# Patient Record
Sex: FEMALE | Race: WHITE | NOT HISPANIC OR LATINO | Employment: OTHER | URBAN - METROPOLITAN AREA
[De-identification: names, ages, dates, MRNs, and addresses within clinical notes are randomized per-mention and may not be internally consistent; named-entity substitution may affect disease eponyms.]

---

## 2017-06-06 ENCOUNTER — TELEPHONE (OUTPATIENT)
Dept: FAMILY MEDICINE | Facility: CLINIC | Age: 67
End: 2017-06-06

## 2017-06-06 ENCOUNTER — OFFICE VISIT (OUTPATIENT)
Dept: FAMILY MEDICINE | Facility: CLINIC | Age: 67
End: 2017-06-06
Payer: COMMERCIAL

## 2017-06-06 VITALS
OXYGEN SATURATION: 98 % | DIASTOLIC BLOOD PRESSURE: 74 MMHG | BODY MASS INDEX: 35.44 KG/M2 | HEIGHT: 63 IN | SYSTOLIC BLOOD PRESSURE: 130 MMHG | HEART RATE: 69 BPM | WEIGHT: 200 LBS | TEMPERATURE: 98.1 F

## 2017-06-06 DIAGNOSIS — R30.0 DYSURIA: Primary | ICD-10-CM

## 2017-06-06 LAB
ALBUMIN UR-MCNC: 30 MG/DL
APPEARANCE UR: ABNORMAL
BILIRUB UR QL STRIP: NEGATIVE
COLOR UR AUTO: YELLOW
GLUCOSE UR STRIP-MCNC: NEGATIVE MG/DL
HGB UR QL STRIP: ABNORMAL
KETONES UR STRIP-MCNC: NEGATIVE MG/DL
LEUKOCYTE ESTERASE UR QL STRIP: ABNORMAL
NITRATE UR QL: NEGATIVE
NON-SQ EPI CELLS #/AREA URNS LPF: ABNORMAL /LPF
PH UR STRIP: 6 PH (ref 5–7)
RBC #/AREA URNS AUTO: ABNORMAL /HPF (ref 0–2)
SP GR UR STRIP: 1.02 (ref 1–1.03)
URN SPEC COLLECT METH UR: ABNORMAL
UROBILINOGEN UR STRIP-ACNC: 0.2 EU/DL (ref 0.2–1)
WBC #/AREA URNS AUTO: ABNORMAL /HPF (ref 0–2)

## 2017-06-06 PROCEDURE — 99213 OFFICE O/P EST LOW 20 MIN: CPT | Performed by: INTERNAL MEDICINE

## 2017-06-06 PROCEDURE — 81001 URINALYSIS AUTO W/SCOPE: CPT | Performed by: INTERNAL MEDICINE

## 2017-06-06 PROCEDURE — 87086 URINE CULTURE/COLONY COUNT: CPT | Performed by: INTERNAL MEDICINE

## 2017-06-06 RX ORDER — CALCIUM CARBONATE 500(1250)
1200 TABLET ORAL DAILY
COMMUNITY
End: 2018-09-14

## 2017-06-06 RX ORDER — SULFAMETHOXAZOLE/TRIMETHOPRIM 800-160 MG
1 TABLET ORAL 2 TIMES DAILY
Qty: 6 TABLET | Refills: 0 | Status: SHIPPED | OUTPATIENT
Start: 2017-06-06 | End: 2017-06-09

## 2017-06-06 NOTE — MR AVS SNAPSHOT
"              After Visit Summary   2017    Tika Swartz    MRN: [id number]           Patient Information     Date Of Birth          1950        Visit Information        Provider Department      2017 1:00 PM Estuardo Last MD [facility]        Today's Diagnoses     Dysuria    -  1      Care Instructions    Take the prescription twice daily for 3 days and if your symptoms do not go away let me know.    Estuardo Last M.D.            Follow-ups after your visit        Who to contact     If you have questions or need follow up information about today's clinic visit or your schedule please contact [facility] directly at [phone].  Normal or non-critical lab and imaging results will be communicated to you by MyChart, letter or phone within 4 business days after the clinic has received the results. If you do not hear from us within 7 days, please contact the clinic through MyChart or phone. If you have a critical or abnormal lab result, we will notify you by phone as soon as possible.  Submit refill requests through [employer] or call your pharmacy and they will forward the refill request to us. Please allow 3 business days for your refill to be completed.          Additional Information About Your Visit        MyChart Information     [employer] lets you send messages to your doctor, view your test results, renew your prescriptions, schedule appointments and more. To sign up, go to [email or web address] . Click on \"Log in\" on the left side of the screen, which will take you to the Welcome page. Then click on \"Sign up Now\" on the right side of the page.     You will be asked to enter the access code listed below, as well as some personal information. Please follow the directions to create your username and password.     Your access code is: [email or web address]  Expires: 2017  1:31 PM     Your access code will  in 90 days. If you need help or a new code, please call your " "[facility] or [phone].        Care EveryWhere ID     This is your Care EveryWhere ID. This could be used by other organizations to access your [place] medical records  [id number]        Your Vitals Were     Pulse Temperature Height Last Period Pulse Oximetry Breastfeeding?    69 98.1  F (36.7  C) (Oral) 5' 2.5\" (1.588 m) 04/13/2001 98% No    BMI (Body Mass Index)                   36 kg/m2            Blood Pressure from Last 3 Encounters:   06/06/17 130/74   11/08/16 123/72   09/29/16 128/76    Weight from Last 3 Encounters:   06/06/17 200 lb (90.7 kg)   11/08/16 200 lb (90.7 kg)   09/29/16 220 lb (99.8 kg)              We Performed the Following     *UA reflex to Microscopic     Urine Culture Aerobic Bacterial     Urine Microscopic          Today's Medication Changes          These changes are accurate as of: 6/6/17  1:31 PM.  If you have any questions, ask your nurse or doctor.               Start taking these medicines.        Dose/Directions    sulfamethoxazole-trimethoprim 800-160 MG per tablet   Commonly known as:  BACTRIM DS/SEPTRA DS   Used for:  Dysuria   Started by:  Estuardo Last MD        Dose:  1 tablet   Take 1 tablet by mouth 2 times daily for 3 days   Quantity:  6 tablet   Refills:  0         Stop taking these medicines if you haven't already. Please contact your care team if you have questions.     alendronate 70 MG tablet   Commonly known as:  FOSAMAX   Stopped by:  Estuardo Last MD                Where to get your medicines      These medications were sent to [place] [address] [facility] [address]  [address] [facility] [address]     Phone:  [phone]     sulfamethoxazole-trimethoprim 800-160 MG per tablet                Primary Care Provider Office Phone # Fax #    Estuardo Last [phone]       [facility] [address]   [place] [address]        Thank you!     Thank you for choosing " [facility]  for your care. Our goal is always to provide you with excellent care. Hearing back from our patients is one way we can continue to improve our services. Please take a few minutes to complete the written survey that you may receive in the mail after your visit with us. Thank you!             Your Updated Medication List - Protect others around you: Learn how to safely use, store and throw away your medicines at www.disposemymeds.org.          This list is accurate as of: 6/6/17  1:31 PM.  Always use your most recent med list.                   Brand Name Dispense Instructions for use    calcium carbonate 1250 MG tablet    OS-CHESTER 500 mg [place]. [place]     Take 1,200 mg by mouth daily       Multi-vitamin Tabs tablet   Generic drug:  multivitamin, therapeutic with minerals      1 TABLET DAILY       sulfamethoxazole-trimethoprim 800-160 MG per tablet    BACTRIM DS/SEPTRA DS    6 tablet    Take 1 tablet by mouth 2 times daily for 3 days       TYLENOL EXTRA STRENGTH PO          VITAMIN D (CHOLECALCIFEROL) PO      Take 2,000 Units by mouth daily

## 2017-06-06 NOTE — NURSING NOTE
"Chief Complaint   Patient presents with     UTI       Initial /74  Pulse 69  Temp 98.1  F (36.7  C) (Oral)  Ht 5' 2.5\" (1.588 m)  Wt 200 lb (90.7 kg)  LMP 04/13/2001  SpO2 98%  Breastfeeding? No  BMI 36 kg/m2 Estimated body mass index is 36 kg/(m^2) as calculated from the following:    Height as of this encounter: 5' 2.5\" (1.588 m).    Weight as of this encounter: 200 lb (90.7 kg).  Medication Reconciliation: complete   Leandra SALTER CMA      "

## 2017-06-06 NOTE — PROGRESS NOTES
Tika Swartz is a 67 year old female who presents for possible uti.  3 weeks ago some fatigue, lb ache and dysuria, tried more liq then fine but then back last 4 days.  Dysuria, some freq, no pyuria or hematuria, slight discomfort lower abdomen, slight lb aches, no f,c,s.  NO vag blood.  Prior uti years ago.    Past Medical History:   Diagnosis Date     Adenomatous colon polyp 2006    fu rec 2011, fu done 2016 and polyps, to fu 2019     CA - breast cancer 2006    bilat mastectomy, no chemo or xrt, Dr. Garcia, not seeing onc as of 2015     Fibromyalgia      Osteoporosis 2016    -1.9 left hip, -0.8 spine, dx based on low impact fx, tried fosamax but gi upset     Past Surgical History:   Procedure Laterality Date     BIOPSY OF BREAST, NEEDLE CORE  11/22/05    right breast     BIOPSY OF BREAST, NEEDLE CORE  12/5/05    left and right breast     C VAGINAL HYSTERECTOMY  2005    prolapsed uterus     fractured wrist Left 2015    low impact     HYSTERECTOMY, PAP STILL INDICATED  2005     MASTECTOMY SIMPLE BILATERAL  2005     Social History     Social History     Marital status:      Spouse name: N/A     Number of children: 2     Years of education: N/A     Occupational History     re[employer]      Social History Main Topics     Smoking status: Never Smoker     Smokeless tobacco: Never Used     Alcohol use 0.0 oz/week     0 Standard drinks or equivalent per week      Comment: occasionally     Drug use: No     Sexual activity: Not Currently     Other Topics Concern     Parent/Sibling W/ Cabg, Mi Or Angioplasty Before 65f 55m? No     Social History Narrative     Current Outpatient Prescriptions   Medication Sig Dispense Refill     calcium carbonate (OS-CHESTER 500 MG "[employer]. [place]) 1250 MG tablet Take 1,200 mg by mouth daily       VITAMIN D, CHOLECALCIFEROL, PO Take 2,000 Units by mouth daily       Acetaminophen (TYLENOL EXTRA STRENGTH PO)        MULTI-VITAMIN OR TABS 1 TABLET DAILY       Allergies   Allergen Reactions      "Seasonal Allergies      Eye watering, nasal symptoms     FAMILY HISTORY NOTED AND REVIEWED    REVIEW OF SYSTEMS: above    PHYSICAL EXAM    /74  Pulse 69  Temp 98.1  F (36.7  C) (Oral)  Ht 5' 2.5\" (1.588 m)  Wt 200 lb (90.7 kg)  LMP 04/13/2001  SpO2 98%  Breastfeeding? No  BMI 36 kg/m2    Patient appears non toxic  Abdomen normal active bowel sounds, soft, min tender, no mgr, no hepatosplenomegaly    ua noted    ASSESSMENT:  Uncomplicated uti    PLAN:  Bactrim ds bid for 3 days, call if c/o not gone soon    Estuardo Last M.D.          "

## 2017-06-06 NOTE — PATIENT INSTRUCTIONS
Take the prescription twice daily for 3 days and if your symptoms do not go away let me know.    Estuardo Last M.D.

## 2017-06-06 NOTE — TELEPHONE ENCOUNTER
Reason for call:  Patient reporting a symptom    Symptom or request: poss bladder infection, aching all over, back pain,  Incontinence, burning, had symptoms a month ago but resolved    Duration (how long have symptoms been present): 5 days    Have you been treated for this before? Yes    Additional comments: worse today would like to be worked in     Phone Number patient can be reached at:  Home number on file [phone] (home)    Best Time:  anytime    Can we leave a detailed message on this number:  YES    Call taken on 6/6/2017 at 8:10 AM by Selina Jacobs

## 2017-06-07 LAB
BACTERIA SPEC CULT: NORMAL
MICRO REPORT STATUS: NORMAL
SPECIMEN SOURCE: NORMAL

## 2017-06-09 ENCOUNTER — TELEPHONE (OUTPATIENT)
Dept: FAMILY MEDICINE | Facility: CLINIC | Age: 67
End: 2017-06-09

## 2017-06-09 NOTE — TELEPHONE ENCOUNTER
Please call patient, uc is negative, can stop the antibiotic, if ongoing urine issues let me know    Estuardo Last M.D.

## 2017-06-09 NOTE — TELEPHONE ENCOUNTER
Spoke with patient and she states she her sx's have resolved -and has understanding of what was stated to her in her results.will f/u segundo Michelle- CMA

## 2017-09-25 ENCOUNTER — ALLIED HEALTH/NURSE VISIT (OUTPATIENT)
Dept: NURSING | Facility: CLINIC | Age: 67
End: 2017-09-25
Payer: COMMERCIAL

## 2017-09-25 DIAGNOSIS — Z23 NEED FOR PROPHYLACTIC VACCINATION AND INOCULATION AGAINST INFLUENZA: Primary | ICD-10-CM

## 2017-09-25 PROCEDURE — 99207 ZZC NO CHARGE NURSE ONLY: CPT

## 2017-09-25 PROCEDURE — G0008 ADMIN INFLUENZA VIRUS VAC: HCPCS

## 2017-09-25 PROCEDURE — 90662 IIV NO PRSV INCREASED AG IM: CPT

## 2017-09-25 NOTE — PROGRESS NOTES
Injectable Influenza Immunization Documentation    1.  Is the person to be vaccinated sick today?   No    2. Does the person to be vaccinated have an allergy to a component   of the vaccine?   No    3. Has the person to be vaccinated ever had a serious reaction   to influenza vaccine in the past?   No    4. Has the person to be vaccinated ever had Guillain-Barré syndrome?   No    Form completed by Arleth Raymundo CMA

## 2017-09-25 NOTE — MR AVS SNAPSHOT
"              After Visit Summary   2017    Tika Swartz    MRN: [id number]           Patient Information     Date Of Birth          1950        Visit Information        Provider Department      2017 11:00 AM CS NURSE [facility] Mikki        Today's Diagnoses     Need for prophylactic vaccination and inoculation against influenza    -  1       Follow-ups after your visit        Who to contact     If you have questions or need follow up information about today's clinic visit or your schedule please contact [facility] directly at [phone].  Normal or non-critical lab and imaging results will be communicated to you by payworkshart, letter or phone within 4 business days after the clinic has received the results. If you do not hear from us within 7 days, please contact the clinic through [employer] or phone. If you have a critical or abnormal lab result, we will notify you by phone as soon as possible.  Submit refill requests through [employer] or call your pharmacy and they will forward the refill request to us. Please allow 3 business days for your refill to be completed.          Additional Information About Your Visit        MyChart Information     [employer] lets you send messages to your doctor, view your test results, renew your prescriptions, schedule appointments and more. To sign up, go to [email or web address] . Click on \"Log in\" on the left side of the screen, which will take you to the Welcome page. Then click on \"Sign up Now\" on the right side of the page.     You will be asked to enter the access code listed below, as well as some personal information. Please follow the directions to create your username and password.     Your access code is: [email or web address]  Expires: 2017 11:14 AM     Your access code will  in 90 days. If you need help or a new code, please call your [facility] or [phone].        Care EveryWhere ID     This is your Care EveryWhere ID. This " could be used by other organizations to access your [facility] medical records  [id number]        Your Vitals Were     Last Period                   04/13/2001            Blood Pressure from Last 3 Encounters:   06/06/17 130/74   11/08/16 123/72   09/29/16 128/76    Weight from Last 3 Encounters:   06/06/17 200 lb (90.7 kg)   11/08/16 200 lb (90.7 kg)   09/29/16 220 lb (99.8 kg)              We Performed the Following     ADMIN INFLUENZA (For MEDICARE Patients ONLY) []     FLU VACCINE, INCREASED ANTIGEN, PRESV FREE, AGE 65+ [19815]        Primary Care Provider Office Phone # Fax #    Estuardo Jack Last [phone] [address] [address]        Equal Access to Services     [facility] MARCI : Hadii james steele hadasho Sostuart, waaxda luqadaha, qaybta kaalmada adri, oscar love . So [facility] [phone].    ATENCIÓN: Si habla español, tiene a darling disposición servicios gratuitos de asistencia lingüística. Azael al [phone].    We comply with applicable federal civil rights laws and [place] laws. We do not discriminate on the basis of race, color, national origin, age, disability sex, sexual orientation or gender identity.            Thank you!     Thank you for choosing [facility]  for your care. Our goal is always to provide you with excellent care. Hearing back from our patients is one way we can continue to improve our services. Please take a few minutes to complete the written survey that you may receive in the mail after your visit with us. Thank you!             Your Updated Medication List - Protect others around you: Learn how to safely use, store and throw away your medicines at www.disposemymeds.org.          This list is accurate as of: 9/25/17 11:14 AM.  Always use your most recent med list.                   Brand Name Dispense Instructions for use Diagnosis    calcium carbonate 1250 MG tablet    OS-CHESTER 500 mg [place]. [place]      Take 1,200 mg by mouth daily        Multi-vitamin Tabs tablet   Generic drug:  multivitamin, therapeutic with minerals      1 TABLET DAILY        TYLENOL EXTRA STRENGTH PO           VITAMIN D (CHOLECALCIFEROL) PO      Take 2,000 Units by mouth daily

## 2018-09-14 ENCOUNTER — OFFICE VISIT (OUTPATIENT)
Dept: FAMILY MEDICINE | Facility: CLINIC | Age: 68
End: 2018-09-14
Payer: COMMERCIAL

## 2018-09-14 VITALS
DIASTOLIC BLOOD PRESSURE: 64 MMHG | HEIGHT: 63 IN | TEMPERATURE: 97 F | WEIGHT: 200 LBS | SYSTOLIC BLOOD PRESSURE: 136 MMHG | HEART RATE: 62 BPM | OXYGEN SATURATION: 97 % | BODY MASS INDEX: 35.44 KG/M2

## 2018-09-14 DIAGNOSIS — C50.919 MALIGNANT NEOPLASM OF FEMALE BREAST, UNSPECIFIED ESTROGEN RECEPTOR STATUS, UNSPECIFIED LATERALITY, UNSPECIFIED SITE OF BREAST (H): ICD-10-CM

## 2018-09-14 DIAGNOSIS — Z23 NEED FOR PROPHYLACTIC VACCINATION AND INOCULATION AGAINST INFLUENZA: ICD-10-CM

## 2018-09-14 DIAGNOSIS — M81.0 OSTEOPOROSIS, UNSPECIFIED OSTEOPOROSIS TYPE, UNSPECIFIED PATHOLOGICAL FRACTURE PRESENCE: ICD-10-CM

## 2018-09-14 DIAGNOSIS — D12.6 ADENOMATOUS POLYP OF COLON, UNSPECIFIED PART OF COLON: ICD-10-CM

## 2018-09-14 DIAGNOSIS — M79.7 FIBROMYALGIA: ICD-10-CM

## 2018-09-14 DIAGNOSIS — Z00.00 ROUTINE GENERAL MEDICAL EXAMINATION AT A HEALTH CARE FACILITY: Primary | ICD-10-CM

## 2018-09-14 DIAGNOSIS — E66.9 NON MORBID OBESITY, UNSPECIFIED OBESITY TYPE: ICD-10-CM

## 2018-09-14 LAB
ERYTHROCYTE [DISTWIDTH] IN BLOOD BY AUTOMATED COUNT: 13.8 % (ref 10–15)
HCT VFR BLD AUTO: 46 % (ref 35–47)
HGB BLD-MCNC: 15.4 G/DL (ref 11.7–15.7)
MCH RBC QN AUTO: 31.4 PG (ref 26.5–33)
MCHC RBC AUTO-ENTMCNC: 33.5 G/DL (ref 31.5–36.5)
MCV RBC AUTO: 94 FL (ref 78–100)
PLATELET # BLD AUTO: 178 10E9/L (ref 150–450)
RBC # BLD AUTO: 4.91 10E12/L (ref 3.8–5.2)
WBC # BLD AUTO: 6.8 10E9/L (ref 4–11)

## 2018-09-14 PROCEDURE — 36415 COLL VENOUS BLD VENIPUNCTURE: CPT | Performed by: INTERNAL MEDICINE

## 2018-09-14 PROCEDURE — 85027 COMPLETE CBC AUTOMATED: CPT | Performed by: INTERNAL MEDICINE

## 2018-09-14 PROCEDURE — 90732 PPSV23 VACC 2 YRS+ SUBQ/IM: CPT | Performed by: INTERNAL MEDICINE

## 2018-09-14 PROCEDURE — 80053 COMPREHEN METABOLIC PANEL: CPT | Performed by: INTERNAL MEDICINE

## 2018-09-14 PROCEDURE — G0009 ADMIN PNEUMOCOCCAL VACCINE: HCPCS | Performed by: INTERNAL MEDICINE

## 2018-09-14 PROCEDURE — G0008 ADMIN INFLUENZA VIRUS VAC: HCPCS | Performed by: INTERNAL MEDICINE

## 2018-09-14 PROCEDURE — 82306 VITAMIN D 25 HYDROXY: CPT | Performed by: INTERNAL MEDICINE

## 2018-09-14 PROCEDURE — 90662 IIV NO PRSV INCREASED AG IM: CPT | Performed by: INTERNAL MEDICINE

## 2018-09-14 PROCEDURE — 80061 LIPID PANEL: CPT | Performed by: INTERNAL MEDICINE

## 2018-09-14 PROCEDURE — G0439 PPPS, SUBSEQ VISIT: HCPCS | Performed by: INTERNAL MEDICINE

## 2018-09-14 NOTE — PROGRESS NOTES
Injectable Influenza Immunization Documentation    1.  Is the person to be vaccinated sick today?   No    2. Does the person to be vaccinated have an allergy to a component   of the vaccine?   No  Egg Allergy Algorithm Link    3. Has the person to be vaccinated ever had a serious reaction   to influenza vaccine in the past?   No    4. Has the person to be vaccinated ever had Guillain-Barré syndrome?   No    Form completed by Rae Ortiz CMA  Prior to injection verified patient identity using patient's name and date of birth.  Due to injection administration, patient instructed to remain in clinic for 15 minutes  afterwards, and to report any adverse reaction to me immediately.

## 2018-09-14 NOTE — MR AVS SNAPSHOT
After Visit Summary   9/14/2018    Tiak Swartz    MRN: [id number]           Patient Information     Date Of Birth          1950        Visit Information        Provider Department      9/14/2018 11:30 AM Estuardo Last MD [facility]        Today's Diagnoses     Routine general medical examination at a health care facility    -  1    Malignant neoplasm of female breast, unspecified estrogen receptor status, unspecified laterality, unspecified site of breast (H)        Adenomatous polyp of colon, unspecified part of colon        Fibromyalgia        Osteoporosis, unspecified osteoporosis type, unspecified pathological fracture presence        Non morbid obesity, unspecified obesity type          Care Instructions      Preventive Health Recommendations    Female Ages 65 +    Yearly exam:     See your health care provider every year in order to  o Review health changes.   o Discuss preventive care.    o Review your medicines if your doctor has prescribed any.      You no longer need a yearly Pap test unless you've had an abnormal Pap test in the past 10 years. If you have vaginal symptoms, such as bleeding or discharge, be sure to talk with your provider about a Pap test.      Every 1 to 2 years, have a mammogram.  If you are over 69, talk with your health care provider about whether or not you want to continue having screening mammograms.      Every 10 years, have a colonoscopy. Or, have a yearly FIT test (stool test). These exams will check for colon cancer.       Have a cholesterol test every 5 years, or more often if your doctor advises it.       Have a diabetes test (fasting glucose) every three years. If you are at risk for diabetes, you should have this test more often.       At age 65, have a bone density scan (DEXA) to check for osteoporosis (brittle bone disease).    Shots:    Get a flu shot each year.    Get a tetanus shot every 10 years.    Talk to your doctor about  your pneumonia vaccines. There are now two you should receive - Pneumovax (PPSV 23) and Prevnar (PCV 13).    Talk to your pharmacist about the shingles vaccine.    Talk to your doctor about the hepatitis B vaccine.    Nutrition:     Eat at least 5 servings of fruits and vegetables each day.      Eat whole-grain bread, whole-wheat pasta and brown rice instead of white grains and rice.      Get adequate Calcium and Vitamin D.     Lifestyle    Exercise at least 150 minutes a week (30 minutes a day, 5 days a week). This will help you control your weight and prevent disease.      Limit alcohol to one drink per day.      No smoking.       Wear sunscreen to prevent skin cancer.       See your dentist twice a year for an exam and cleaning.      See your eye doctor every 1 to 2 years to screen for conditions such as glaucoma, macular degeneration and cataracts.          Follow-ups after your visit        Future tests that were ordered for you today     Open Future Orders        Priority Expected Expires Ordered    DX Hip/Pelvis/Spine Routine 9/14/2018 9/14/2019 9/14/2018            Who to contact     If you have questions or need follow up information about today's clinic visit or your schedule please contact [facility] directly at [phone].  Normal or non-critical lab and imaging results will be communicated to you by MyChart, letter or phone within 4 business days after the clinic has received the results. If you do not hear from us within 7 days, please contact the clinic through MyChart or phone. If you have a critical or abnormal lab result, we will notify you by phone as soon as possible.  Submit refill requests through [employer] or call your pharmacy and they will forward the refill request to us. Please allow 3 business days for your refill to be completed.          Additional Information About Your Visit        Care EveryWhere ID     This is your Care EveryWhere ID. This could be used by other  "organizations to access your [place] medical records  [id number]        Your Vitals Were     Pulse Temperature Height Last Period Pulse Oximetry Breastfeeding?    62 97  F (36.1  C) (Oral) 5' 2.5\" (1.588 m) 04/13/2001 97% No    BMI (Body Mass Index)                   36 kg/m2            Blood Pressure from Last 3 Encounters:   09/14/18 136/64   06/06/17 130/74   11/08/16 123/72    Weight from Last 3 Encounters:   09/14/18 200 lb (90.7 kg)   06/06/17 200 lb (90.7 kg)   11/08/16 200 lb (90.7 kg)              We Performed the Following     CBC with platelets     Comprehensive metabolic panel     HC FLU VACCINE, INCREASED ANTIGEN, PRESV FREE     Lipid panel reflex to direct LDL Non-fasting     PNEUMOCOCCAL VACCINE,ADULT,SQ OR IM     Vitamin D Deficiency          Today's Medication Changes          These changes are accurate as of 9/14/18 11:38 AM.  If you have any questions, ask your nurse or doctor.               Stop taking these medicines if you haven't already. Please contact your care team if you have questions.     calcium carbonate 500 mg {elemental} 500 MG tablet   Commonly known as:  OS-CHESTER   Stopped by:  Estuardo Last MD           Multi-vitamin Tabs tablet   Generic drug:  multivitamin, therapeutic with minerals   Stopped by:  Estuardo Last MD                    Primary Care Provider Office Phone # Fax #    Estuardo Last [phone] [address]   [place] [address]        Equal Access to Services     [facility] AH: Hadii aad ku hadasho Soomaali, waaxda luqadaha, qaybta kaalmada adeegyada, waxjake idiin hayaan adejesus love . So [facility] [phone].    ATENCIÓN: Si habla español, tiene a darling disposición servicios gratuitos de asistencia lingüística. Llame al [phone].    We comply with applicable federal civil rights laws and [place] laws. We do not discriminate on the basis of race, color, national origin, age, disability, sex, sexual " orientation, or gender identity.            Thank you!     Thank you for choosing [facility]  for your care. Our goal is always to provide you with excellent care. Hearing back from our patients is one way we can continue to improve our services. Please take a few minutes to complete the written survey that you may receive in the mail after your visit with us. Thank you!             Your Updated Medication List - Protect others around you: Learn how to safely use, store and throw away your medicines at www.disposemymeds.org.          This list is accurate as of 9/14/18 11:38 AM.  Always use your most recent med list.                   Brand Name Dispense Instructions for use Diagnosis    FISH OIL + D3 PO           TYLENOL EXTRA STRENGTH PO           VITAMIN D (CHOLECALCIFEROL) PO      Take 2,000 Units by mouth daily

## 2018-09-14 NOTE — LETTER
[facility]  [address] [place] [address] [facility]  [address]  [place]  [address]  Tel: [phone]    September 17, 2018    Tika Swartz  [address]   [place] [address]        Dear Ms. Swartz,    It was a pleasure seeing you for your physical examination.  I wanted to get back to you with your test results.  I have enclosed a copy for your review.       I am happy to report that your cbc or complete blood count is normal with no signs of anemia, leukemia or platelet abnormalities. Your chemistry panel shows no signs of diabetes.  Your blood salts, kidney tests, liver tests, vitamin D level, and proteins are all fine.     Your total cholesterol is 173 with the normal range being below 200.  Your HDL or good cholesterol is 47 with the normal range being above 50.  Your LDL or bad cholesterol is 102 with the normal range being below 130.  Overall these numbers are fine.     I am happy to bring you this overall excellent report.  Please be sure to exercise and try to get your weight down.  If you have any questions please call me.     If you have any further questions or problems, please contact our office.      Sincerely,    Estuardo Last MD/jaswinder          Enclosure: Lab Results                  Results for orders placed or performed in visit on 09/14/18   CBC with platelets   Result Value Ref Range    WBC 6.8 4.0 - 11.0 10e9/L    RBC Count 4.91 3.8 - 5.2 10e12/L    Hemoglobin 15.4 11.7 - 15.7 g/dL    Hematocrit 46.0 35.0 - 47.0 %    MCV 94 78 - 100 fl    MCH 31.4 26.5 - 33.0 pg    MCHC 33.5 31.5 - 36.5 g/dL    RDW 13.8 10.0 - 15.0 %    Platelet Count 178 150 - 450 10e9/L   Comprehensive metabolic panel   Result Value Ref Range    Sodium 140 133 - 144 mmol/L    Potassium 4.3 3.4 - 5.3 mmol/L    Chloride 107 94 - 109 mmol/L    Carbon Dioxide 26 20 - 32 mmol/L    Anion Gap 7 3 - 14 mmol/L    Glucose 97 70 - 99 mg/dL    Urea Nitrogen 17 7 - 30 mg/dL    Creatinine 0.87 0.52 - 1.04 mg/dL    GFR Estimate 64  >60 mL/min/1.7m2    GFR Estimate If Black 78 >60 mL/min/1.7m2    Calcium 8.6 8.5 - 10.1 mg/dL    Bilirubin Total 0.7 0.2 - 1.3 mg/dL    Albumin 3.5 3.4 - 5.0 g/dL    Protein Total 7.2 6.8 - 8.8 g/dL    Alkaline Phosphatase 96 40 - 150 U/L    ALT 17 0 - 50 U/L    AST 17 0 - 45 U/L   Lipid panel reflex to direct LDL Non-fasting   Result Value Ref Range    Cholesterol 173 <200 mg/dL    Triglycerides 120 <150 mg/dL    HDL Cholesterol 47 (L) >49 mg/dL    LDL Cholesterol Calculated 102 (H) <100 mg/dL    Non HDL Cholesterol 126 <130 mg/dL   Vitamin D Deficiency   Result Value Ref Range    Vitamin D Deficiency screening 34 20 - 75 ug/L

## 2018-09-14 NOTE — PROGRESS NOTES
SUBJECTIVE:   Tika Swartz is a 68 year old female who presents for Preventive Visit.    She is doing fine but weight an issue.  Some aches due to fibro but not bad, up to date colon exam, needs bone density.  She notes when walking can at times feel a bit of air hunger, but has been for years, not new or changed and no chest pain or shortness of breath at rest, pnd or edema, no coughs, no f,cs.  No other c/o               Past Medical History:      Past Medical History:   Diagnosis Date     Adenomatous colon polyp 2006    fu rec 2011, fu done 2016 and polyps, to fu 2019     CA - breast cancer 2006    bilat mastectomy, no chemo or xrt, Dr. Garcia, not seeing onc as of 2015     Fibromyalgia      Osteoporosis 2016    -1.9 left hip, -0.8 spine, dx based on low impact fx, tried fosamax but gi upset             Past Surgical History:      Past Surgical History:   Procedure Laterality Date     BIOPSY OF BREAST, NEEDLE CORE  11/22/05    right breast     BIOPSY OF BREAST, NEEDLE CORE  12/5/05    left and right breast     C VAGINAL HYSTERECTOMY  2005    prolapsed uterus     fractured wrist Left 2015    low impact     HYSTERECTOMY, PAP STILL INDICATED  2005     MASTECTOMY SIMPLE BILATERAL  2005             Social History:     Social History     Social History     Marital status:      Spouse name: N/A     Number of children: 2     Years of education: N/A     Occupational History     [employer]      Social History Main Topics     Smoking status: Never Smoker     Smokeless tobacco: Never Used     Alcohol use 0.0 oz/week     0 Standard drinks or equivalent per week      Comment: occasionally     Drug use: No     Sexual activity: Not Currently     Other Topics Concern     Parent/Sibling W/ Cabg, Mi Or Angioplasty Before 65f 55m? No     Social History Narrative             Family History:   reviewed         Allergies:     Allergies   Allergen Reactions     Seasonal Allergies      Eye watering, nasal symptoms              "Medications:     Current Outpatient Prescriptions   Medication Sig Dispense Refill     Acetaminophen (TYLENOL EXTRA STRENGTH PO)        Fish Oil-Cholecalciferol (FISH OIL + D3 PO)        VITAMIN D, CHOLECALCIFEROL, PO Take 2,000 Units by mouth daily                 Review of Systems:   The 10 point Review of Systems is negative other than noted in the HPI           Physical Exam:   Blood pressure 136/64, pulse 62, temperature 97  F (36.1  C), temperature source Oral, height 5' 2.5\" (1.588 m), weight 200 lb (90.7 kg), last menstrual period 04/13/2001, SpO2 97 %, not currently breastfeeding.    Exam:  Constitutional: healthy appearing, alert and in no distress  Heent: Normocephalic. Head without obvious masses or lesions. PERRLDC, EOMI. Mouth exam within normal limits: tongue, mucous membranes, posterior pharynx all normal, no lesions or abnormalities seen.  Tm's and canals within normal limits bilaterally. Neck supple, no nuchal rigidity or masses. No supraclavicular, or cervical adenopathy. Thyroid symmetric, no masses.  Cardiovascular: Regular rate and rhythm, no murmer, rub or gallops.  JVP not elevated, no edema.  Carotids within normal limits bilaterally, no bruits.  Respiratory: Normal respiratory effort.  Lungs clear, normal flow, no wheezing or crackles.  Breasts: Normal bilaterally.  No masses or lesions.  Nipples within normal limits.  No axillary lesions or nodes.  My M.A. Was present during this part of the examination.  Gastrointestinal: Normal active bowel sounds.   Soft, not tender, no masses, guarding or rebound.  No hepatosplenomegaly.   Musculoskeletal: extremities normal, no gross deformities noted.  Skin: no suspicious lesions or rashes   Neurologic: Mental status within normal limits.  Speech fluent.  No gross motor abnormalities and gait intact.  Psychiatric: mentation appears normal and affect normal.         Data:   Labs sent        Assessment:   1. Normal complete physical exam  2. Obesity, " weight loss  3. Ca breast, alessandro  4. Osteopor. Did not tolerate fosamax, to get dexa, consider prolia  5. Fibromyalgia  6. Colon polyp, follow up as noted  7. Shortness of breath, suspect obesity, doubt cv, pulmonary embolis, tumor, asthma.  I did rec est to be safe but she wants to wait         Plan:   Exercise, diet and weight loss  Pneumovax and flu shot  Letter with labs  dexa  Call if change in symptoms      Estuardo Last M.D.              Are you in the first 12 months of your Medicare Part B coverage?      Healthy Habits:    Do you get at least three servings of calcium containing foods daily (dairy, green leafy vegetables, etc.)? yes    Amount of exercise or daily activities, outside of work: 7 day(s) per week    Problems taking medications regularly No    Medication side effects: No    Have you had an eye exam in the past two years? no    Do you see a dentist twice per year? yes    Do you have sleep apnea, excessive snoring or daytime drowsiness?no      Ability to successfully perform activities of daily living: Yes, no assistance needed    Home safety:  none identified     Hearing impairment: No    Fall risk:           COGNITIVE SCREEN  1) Repeat 3 items (Leader, Season, Table)    2) Clock draw:   3) 3 item recall: Recalls 3 objects  Results: 3 items recalled: COGNITIVE IMPAIRMENT LESS LIKELY    Mini-CogTM Copyright S Alejandrina. Licensed by the author for use in [facility]; reprinted with permission (mau@.[facility]). All rights reserved.                Reviewed and updated as needed this visit by clinical staff  Tobacco  Allergies  Meds  Problems  Med Hx  Surg Hx  Fam Hx  Soc Hx          Reviewed and updated as needed this visit by Provider        Social History   Substance Use Topics     Smoking status: Never Smoker     Smokeless tobacco: Never Used     Alcohol use 0.0 oz/week     0 Standard drinks or equivalent per week      Comment: occasionally       If you drink alcohol do you typically  "have >3 drinks per day or >7 drinks per week? No                        Today's PHQ-2 Score:   PHQ-2 ( 1999 Pfizer) 6/6/2017 9/29/2016   Q1: Little interest or pleasure in doing things 0 0   Q2: Feeling down, depressed or hopeless 0 0   PHQ-2 Score 0 0       Do you feel safe in your environment - Yes    Do you have a Health Care Directive?: No: Advance care planning was reviewed with patient; patient declined at this time.    Current providers sharing in care for this patient include:   Patient Care Team:  Estuardo Last MD as PCP - General    The following health maintenance items are reviewed in Epic and correct as of today:  Health Maintenance   Topic Date Due     ADVANCE DIRECTIVE PLANNING Q5 YRS  02/07/2005     FALL RISK ASSESSMENT  09/29/2017     PNEUMOCOCCAL (2 of 2 - PPSV23) 09/29/2017     PHQ-2 Q1 YR  06/06/2018     INFLUENZA VACCINE (1) 09/01/2018     LIPID SCREEN Q5 YR FEMALE (SYSTEM ASSIGNED)  09/29/2021     TETANUS IMMUNIZATION (SYSTEM ASSIGNED)  02/19/2023     COLON CANCER SCREEN (SYSTEM ASSIGNED)  11/08/2026     DEXA SCAN SCREENING (SYSTEM ASSIGNED)  Completed     HEPATITIS C SCREENING  Completed           End of Life Planning:  Patient currently has an advanced directive: none, I rec it    COUNSELING:  Reviewed preventive health counseling, as reflected in patient instructions       Regular exercise       Healthy diet/nutrition    BP Readings from Last 1 Encounters:   06/06/17 130/74     Estimated body mass index is 36 kg/(m^2) as calculated from the following:    Height as of 6/6/17: 5' 2.5\" (1.588 m).    Weight as of 6/6/17: 200 lb (90.7 kg).      Weight management plan: exercise, diet     reports that she has never smoked. She has never used smokeless tobacco.      Appropriate preventive services were discussed with this patient, including applicable screening as appropriate for cardiovascular disease, diabetes, osteopenia/osteoporosis, and glaucoma.  As appropriate for age/gender, " discussed screening for colorectal cancer, prostate cancer, breast cancer, and cervical cancer. Checklist reviewing preventive services available has been given to the patient.    Reviewed patients plan of care and provided an AVS. The Basic Care Plan (routine screening as documented in Health Maintenance) for Tika meets the Care Plan requirement. This Care Plan has been established and reviewed with the Patient.    Counseling Resources:  ATP IV Guidelines  Pooled Cohorts Equation Calculator  Breast Cancer Risk Calculator  FRAX Risk Assessment  ICSI Preventive Guidelines  Dietary Guidelines for Americans, 2010  USDA's MyPlate  ASA Prophylaxis  Lung CA Screening    Estuardo Last MD  [facility]

## 2018-09-15 LAB
ALBUMIN SERPL-MCNC: 3.5 G/DL (ref 3.4–5)
ALP SERPL-CCNC: 96 U/L (ref 40–150)
ALT SERPL W P-5'-P-CCNC: 17 U/L (ref 0–50)
ANION GAP SERPL CALCULATED.3IONS-SCNC: 7 MMOL/L (ref 3–14)
AST SERPL W P-5'-P-CCNC: 17 U/L (ref 0–45)
BILIRUB SERPL-MCNC: 0.7 MG/DL (ref 0.2–1.3)
BUN SERPL-MCNC: 17 MG/DL (ref 7–30)
CALCIUM SERPL-MCNC: 8.6 MG/DL (ref 8.5–10.1)
CHLORIDE SERPL-SCNC: 107 MMOL/L (ref 94–109)
CHOLEST SERPL-MCNC: 173 MG/DL
CO2 SERPL-SCNC: 26 MMOL/L (ref 20–32)
CREAT SERPL-MCNC: 0.87 MG/DL (ref 0.52–1.04)
GFR SERPL CREATININE-BSD FRML MDRD: 64 ML/MIN/1.7M2
GLUCOSE SERPL-MCNC: 97 MG/DL (ref 70–99)
HDLC SERPL-MCNC: 47 MG/DL
LDLC SERPL CALC-MCNC: 102 MG/DL
NONHDLC SERPL-MCNC: 126 MG/DL
POTASSIUM SERPL-SCNC: 4.3 MMOL/L (ref 3.4–5.3)
PROT SERPL-MCNC: 7.2 G/DL (ref 6.8–8.8)
SODIUM SERPL-SCNC: 140 MMOL/L (ref 133–144)
TRIGL SERPL-MCNC: 120 MG/DL

## 2018-09-17 LAB — DEPRECATED CALCIDIOL+CALCIFEROL SERPL-MC: 34 UG/L (ref 20–75)

## 2018-09-17 NOTE — PROGRESS NOTES
It was a pleasure seeing you for your physical examination.  I wanted to get back to you with your test results.  I have enclosed a copy for your review.      I am happy to report that your cbc or complete blood count is normal with no signs of anemia, leukemia or platelet abnormalities. Your chemistry panel shows no signs of diabetes.  Your blood salts, kidney tests, liver tests, vitamin D level, and proteins are all fine.    Your total cholesterol is 173 with the normal range being below 200.  Your HDL or good cholesterol is 47 with the normal range being above 50.  Your LDL or bad cholesterol is 102 with the normal range being below 130.  Overall these numbers are fine.    I am happy to bring you this overall excellent report.  Please be sure to exercise and try to get your weight down.  If you have any questions please call me.

## 2018-09-18 ENCOUNTER — HOSPITAL ENCOUNTER (OUTPATIENT)
Dept: BONE DENSITY | Facility: CLINIC | Age: 68
Discharge: HOME | End: 2018-09-18
Attending: INTERNAL MEDICINE | Admitting: INTERNAL MEDICINE
Payer: MEDICARE

## 2018-09-18 DIAGNOSIS — M81.0 OSTEOPOROSIS, UNSPECIFIED OSTEOPOROSIS TYPE, UNSPECIFIED PATHOLOGICAL FRACTURE PRESENCE: ICD-10-CM

## 2018-09-18 PROCEDURE — 77080 DXA BONE DENSITY AXIAL: CPT

## 2019-09-11 ENCOUNTER — TELEPHONE (OUTPATIENT)
Dept: FAMILY MEDICINE | Facility: CLINIC | Age: 69
End: 2019-09-11

## 2019-09-11 NOTE — TELEPHONE ENCOUNTER
"Reason for Call:  Medication or medication refill:      Do you use a [facility]?  Name of the pharmacy and phone number for the current request:            Name of the medication requested: ***    Other request: ***    Can we leave a detailed message on this number? { :[phone]::\"YES\"}    Phone number patient can be reached at: {[phone]}    Best Time: ***    Call taken on 9/11/2019 at 8:32 AM by Sancho Gibson      "

## 2019-10-08 ENCOUNTER — ALLIED HEALTH/NURSE VISIT (OUTPATIENT)
Dept: NURSING | Facility: CLINIC | Age: 69
End: 2019-10-08
Payer: COMMERCIAL

## 2019-10-08 DIAGNOSIS — Z23 NEED FOR PROPHYLACTIC VACCINATION AND INOCULATION AGAINST INFLUENZA: Primary | ICD-10-CM

## 2019-10-08 PROCEDURE — 99207 ZZC NO CHARGE NURSE ONLY: CPT

## 2019-10-08 PROCEDURE — 90662 IIV NO PRSV INCREASED AG IM: CPT

## 2019-10-08 PROCEDURE — G0008 ADMIN INFLUENZA VIRUS VAC: HCPCS

## 2020-03-24 ENCOUNTER — TRANSFERRED RECORDS (OUTPATIENT)
Dept: HEALTH INFORMATION MANAGEMENT | Facility: CLINIC | Age: 70
End: 2020-03-24

## 2020-06-04 ENCOUNTER — AMBULATORY - HEALTHEAST (OUTPATIENT)
Dept: MULTI SPECIALTY CLINIC | Facility: CLINIC | Age: 70
End: 2020-06-04

## 2020-06-11 ENCOUNTER — AMBULATORY - HEALTHEAST (OUTPATIENT)
Dept: SURGERY | Facility: CLINIC | Age: 70
End: 2020-06-11

## 2020-06-11 DIAGNOSIS — Z11.59 ENCOUNTER FOR SCREENING FOR OTHER VIRAL DISEASES: ICD-10-CM

## 2020-07-10 ENCOUNTER — OFFICE VISIT (OUTPATIENT)
Dept: FAMILY MEDICINE | Facility: CLINIC | Age: 70
End: 2020-07-10
Payer: COMMERCIAL

## 2020-07-10 VITALS
DIASTOLIC BLOOD PRESSURE: 80 MMHG | SYSTOLIC BLOOD PRESSURE: 142 MMHG | HEIGHT: 63 IN | BODY MASS INDEX: 42.35 KG/M2 | WEIGHT: 239 LBS | HEART RATE: 65 BPM | TEMPERATURE: 97.3 F | OXYGEN SATURATION: 98 %

## 2020-07-10 DIAGNOSIS — E66.01 MORBID OBESITY (H): ICD-10-CM

## 2020-07-10 DIAGNOSIS — D12.6 ADENOMATOUS POLYP OF COLON, UNSPECIFIED PART OF COLON: ICD-10-CM

## 2020-07-10 DIAGNOSIS — Z13.6 ENCOUNTER FOR SCREENING FOR CARDIOVASCULAR DISORDERS: ICD-10-CM

## 2020-07-10 DIAGNOSIS — C50.919 MALIGNANT NEOPLASM OF FEMALE BREAST, UNSPECIFIED ESTROGEN RECEPTOR STATUS, UNSPECIFIED LATERALITY, UNSPECIFIED SITE OF BREAST (H): ICD-10-CM

## 2020-07-10 DIAGNOSIS — M17.12 PRIMARY OSTEOARTHRITIS OF LEFT KNEE: ICD-10-CM

## 2020-07-10 DIAGNOSIS — Z01.818 PREOP GENERAL PHYSICAL EXAM: Primary | ICD-10-CM

## 2020-07-10 LAB
ANION GAP SERPL CALCULATED.3IONS-SCNC: 6 MMOL/L (ref 3–14)
BUN SERPL-MCNC: 18 MG/DL (ref 7–30)
CALCIUM SERPL-MCNC: 8.5 MG/DL (ref 8.5–10.1)
CHLORIDE SERPL-SCNC: 108 MMOL/L (ref 94–109)
CHOLEST SERPL-MCNC: 165 MG/DL
CHOLEST SERPL-MCNC: 165 MG/DL
CO2 SERPL-SCNC: 27 MMOL/L (ref 20–32)
CREAT SERPL-MCNC: 0.85 MG/DL (ref 0.52–1.04)
CREAT SERPL-MCNC: 0.85 MG/DL (ref 0.52–1.04)
ERYTHROCYTE [DISTWIDTH] IN BLOOD BY AUTOMATED COUNT: 13.9 % (ref 10–15)
GFR ESTIMATE EXT - HISTORICAL: 69 ML/MIN/1.73M2
GFR ESTIMATE, IF BLACK EXT - HISTORICAL: 80 ML/MIN/1.73M2
GFR SERPL CREATININE-BSD FRML MDRD: 69 ML/MIN/{1.73_M2}
GLUCOSE SERPL-MCNC: 101 MG/DL (ref 70–99)
HCT VFR BLD AUTO: 44.5 % (ref 35–47)
HDLC SERPL-MCNC: 45 MG/DL
HDLC SERPL-MCNC: 45 MG/DL
HGB BLD-MCNC: 15.1 G/DL (ref 11.7–15.7)
LDLC SERPL CALC-MCNC: 95 MG/DL
LDLC SERPL CALC-MCNC: 95 MG/DL
MCH RBC QN AUTO: 32 PG (ref 26.5–33)
MCHC RBC AUTO-ENTMCNC: 33.9 G/DL (ref 31.5–36.5)
MCV RBC AUTO: 94 FL (ref 78–100)
NON HDL CHOL. (LDL+VLDL): 120
NONHDLC SERPL-MCNC: 120 MG/DL
PLATELET # BLD AUTO: 165 10E9/L (ref 150–450)
POTASSIUM SERPL-SCNC: 3.8 MMOL/L (ref 3.4–5.3)
RBC # BLD AUTO: 4.72 10E12/L (ref 3.8–5.2)
SODIUM SERPL-SCNC: 141 MMOL/L (ref 133–144)
TRIGL SERPL-MCNC: 125 MG/DL
TRIGLYCERIDES (HISTORICAL CONVERSION): 125
WBC # BLD AUTO: 6.9 10E9/L (ref 4–11)

## 2020-07-10 PROCEDURE — 93000 ELECTROCARDIOGRAM COMPLETE: CPT | Performed by: INTERNAL MEDICINE

## 2020-07-10 PROCEDURE — 85027 COMPLETE CBC AUTOMATED: CPT | Performed by: INTERNAL MEDICINE

## 2020-07-10 PROCEDURE — 80061 LIPID PANEL: CPT | Performed by: INTERNAL MEDICINE

## 2020-07-10 PROCEDURE — 99215 OFFICE O/P EST HI 40 MIN: CPT | Performed by: INTERNAL MEDICINE

## 2020-07-10 PROCEDURE — 80048 BASIC METABOLIC PNL TOTAL CA: CPT | Performed by: INTERNAL MEDICINE

## 2020-07-10 PROCEDURE — 36415 COLL VENOUS BLD VENIPUNCTURE: CPT | Performed by: INTERNAL MEDICINE

## 2020-07-10 ASSESSMENT — MIFFLIN-ST. JEOR: SCORE: 1565.29

## 2020-07-10 NOTE — PROGRESS NOTES
[facility]  [address] [address]  [phone]  Dept: [phone]    PRE-OP EVALUATION:  Today's date: 7/10/2020     Tika Swartz (: 1950) presents for pre-operative evaluation assessment as requested by Delta Beltran MD   .  She requires evaluation and anesthesia risk assessment prior to undergoing surgery/procedure for treatment of TOTAL KNEE ARTHROPLASTY .    Proposed Surgery/ Procedure: TOTAL KNEE ARTHROPLASTY  Date of Surgery/ Procedure: 2020  Time of Surgery/ Procedure:   Hospital/Surgical Facility: [facility] OR    Fax number for surgical facility: [phone]  Primary Physician: Estuardo Last  Type of Anesthesia Anticipated: to be determined    Patient has a Health Care Directive or Living Will:  NO    The patient is here for preoperative evaluation.  She is having knee arthroplasty.  She otherwise feels well but her weight is a significant issue and it has gone up.  She has no complaints on review of systems other than her knee pain.                Past Medical History:      Past Medical History:   Diagnosis Date     Adenomatous colon polyp 2006    fu rec , fu done 2016 and polyps, to fu 2019     CA - breast cancer 2006    bilat mastectomy, no chemo or xrt, Dr. Garcia, not seeing onc as of      Fibromyalgia      Osteoporosis 2016    -1.9 left hip, -0.8 spine, dx based on low impact fx, tried fosamax but gi upset             Past Surgical History:      Past Surgical History:   Procedure Laterality Date     BIOPSY OF BREAST, NEEDLE CORE  05    right breast     BIOPSY OF BREAST, NEEDLE CORE  05    left and right breast     C VAGINAL HYSTERECTOMY      prolapsed uterus     fractured wrist Left 2015    low impact     HYSTERECTOMY, PAP STILL INDICATED  2005     MASTECTOMY SIMPLE BILATERAL               Social History:     Social History     Tobacco Use     Smoking status: Never Smoker     Smokeless  "tobacco: Never Used   Substance Use Topics     Alcohol use: Yes     Alcohol/week: 0.0 standard drinks     Comment: occasionally             Family History:   No family history of bleeding difficulty, anesthesia problems or blood clots.         Allergies:     Allergies   Allergen Reactions     Seasonal Allergies      Eye watering, nasal symptoms             Medications:     Current Outpatient Medications   Medication Sig Dispense Refill     Acetaminophen (TYLENOL EXTRA STRENGTH PO)        Fish Oil-Cholecalciferol (FISH OIL + D3 PO)        ibuprofen (ADVIL/MOTRIN) 100 MG tablet Take 100 mg by mouth every 4 hours as needed       VITAMIN D, CHOLECALCIFEROL, PO Take 2,000 Units by mouth daily                 Review of Systems:   No history of bleeding difficulty, anesthesia problems or blood clots.  The 10 point Review of Systems is negative other than noted in the HPI           Physical Exam:   Blood pressure (!) 142/80, pulse 65, temperature 97.3  F (36.3  C), temperature source Temporal, height 1.588 m (5' 2.5\"), weight 108.4 kg (239 lb), last menstrual period 04/13/2001, SpO2 98 %, not currently breastfeeding.    Constitutional: healthy appearing, alert and in no distress  Heent: Normocephalic. Head without obvious masses or lesions. PERRLDC, EOMI. Mouth exam within normal limits: tongue, mucous membranes, posterior pharynx all normal, no lesions or abnormalities seen.   Neck supple, no nuchal rigidity or masses. No supraclavicular, or cervical adenopathy. Thyroid symmetric, no masses.  Cardiovascular: Regular rate and rhythm, no murmer, rub or gallops.  JVP not elevated, no edema.  Carotids within normal limits bilaterally, no bruits.  Respiratory: Normal respiratory effort.  Lungs clear, normal flow, no wheezing or crackles.  Gastrointestinal: Normal active bowel sounds.   Soft, not tender, no masses, guarding or rebound.  No hepatosplenomegaly.   Musculoskeletal: extremities normal, no gross deformities " noted.  Skin: no suspicious lesions or rashes   Neurologic: Mental status within normal limits.  Speech fluent.  No gross motor abnormalities and gait intact.  Psychiatric: mentation appears normal and affect normal.         Data:   Labs sent; ekg - nsr, within normal limits.        Assessment:   `1. Normal pre op exam, the patient should be a reasonable/low surgical risk  2. Obesity, weight loss  3. Breast ca, alessandro  4. Colon polyp, to get follow up after surgery           Plan:   The patient is ok for the procedure  Routine post op dvt prophylaxis with noac, she is at higher risk of clot given her obesity        Estuardo Last M.D.

## 2020-07-10 NOTE — LETTER
July 14, 2020      Tika Swartz  [address]   [place] [address]        Dear ,    We are writing to inform you of your test results.    It was a pleasure seeing you.  I wanted to get back to you with your test results.  I have enclosed a copy for your records.     Your labs look fine.  The sugar is a bit high so please be sure to exercise and get your weight down after surgery.  This should help the cholesterol as well.     Resulted Orders   CBC with platelets   Result Value Ref Range    WBC 6.9 4.0 - 11.0 10e9/L    RBC Count 4.72 3.8 - 5.2 10e12/L    Hemoglobin 15.1 11.7 - 15.7 g/dL    Hematocrit 44.5 35.0 - 47.0 %    MCV 94 78 - 100 fl    MCH 32.0 26.5 - 33.0 pg    MCHC 33.9 31.5 - 36.5 g/dL    RDW 13.9 10.0 - 15.0 %    Platelet Count 165 150 - 450 10e9/L   Basic metabolic panel   Result Value Ref Range    Sodium 141 133 - 144 mmol/L    Potassium 3.8 3.4 - 5.3 mmol/L    Chloride 108 94 - 109 mmol/L    Carbon Dioxide 27 20 - 32 mmol/L    Anion Gap 6 3 - 14 mmol/L    Glucose 101 (H) 70 - 99 mg/dL    Urea Nitrogen 18 7 - 30 mg/dL    Creatinine 0.85 0.52 - 1.04 mg/dL    GFR Estimate 69 >60 mL/min/[1.73_m2]      Comment:      Non  GFR Calc  Starting 12/18/2018, serum creatinine based estimated GFR (eGFR) will be   calculated using the Chronic Kidney Disease Epidemiology Collaboration   (CKD-EPI) equation.      GFR Estimate If Black 80 >60 mL/min/[1.73_m2]      Comment:       GFR Calc  Starting 12/18/2018, serum creatinine based estimated GFR (eGFR) will be   calculated using the Chronic Kidney Disease Epidemiology Collaboration   (CKD-EPI) equation.      Calcium 8.5 8.5 - 10.1 mg/dL   Lipid panel reflex to direct LDL Non-fasting   Result Value Ref Range    Cholesterol 165 <200 mg/dL    Triglycerides 125 <150 mg/dL    HDL Cholesterol 45 (L) >49 mg/dL    LDL Cholesterol Calculated 95 <100 mg/dL      Comment:      Desirable:       <100 mg/dl    Non HDL  Cholesterol 120 <130 mg/dL       If you have any questions or concerns, please call the clinic at the number listed above.       Sincerely,        Estuardo Last MD/ esme ma

## 2020-07-13 ASSESSMENT — MIFFLIN-ST. JEOR: SCORE: 1474.1

## 2020-07-20 DIAGNOSIS — Z20.822 COVID-19 RULED OUT: Primary | ICD-10-CM

## 2020-07-20 LAB
SARS-COV-2 RNA SPEC QL NAA+PROBE: NOT DETECTED
SPECIMEN SOURCE: NORMAL

## 2020-07-20 PROCEDURE — U0003 INFECTIOUS AGENT DETECTION BY NUCLEIC ACID (DNA OR RNA); SEVERE ACUTE RESPIRATORY SYNDROME CORONAVIRUS 2 (SARS-COV-2) (CORONAVIRUS DISEASE [COVID-19]), AMPLIFIED PROBE TECHNIQUE, MAKING USE OF HIGH THROUGHPUT TECHNOLOGIES AS DESCRIBED BY CMS-2020-01-R: HCPCS | Performed by: INTERNAL MEDICINE

## 2020-07-20 NOTE — LETTER
July 21, 2020      Tika DICKENS Swartz  [address]   [place] [address]        Dear ,    We are writing to inform you of your test results.        Resulted Orders   Asymptomatic COVID-19 Virus (Coronavirus) by PCR   Result Value Ref Range    COVID-19 Virus PCR to [facility] - Source Nasopharyngeal     COVID-19 Virus PCR to [facility] - Result Not Detected       Comment:      Collection of multiple specimens from the same patient may be necessary to   detect the virus. The possibility of a false negative should be considered if   the patient's recent exposure or clinical presentation suggests 2019 nCOV   infection and diagnostic tests for other causes of illness are negative.   Repeat testing may be considered in this setting.  Viral RNA was extracted via a validated method and subsequently underwent   single step reverse transcriptase-real time polymerase chain reaction using   primers to the CDC specified N1,N2 gene targets of CoV2 and human RNP as an   internal control.  A negative result does not rule out the presence of real-time PCR inhibitors   in the specimen or COVID-19 RNA in concentrations below the limit of detection   of the assay. The possibility of a false negative should be considered if the   patients recent exposure or clinical presentation suggests COVID-19.   Additional testing or repeat testing requires consultation with the   laborator  y.  Nasopharyngeal specimen is the preferred choice for swab-based SARS CoV2   testing. When collection of a nasopharyngeal swab is not possible the   following are acceptable alternatives:  an oropharyngeal (OP) specimen collected by a healthcare professional, or a   nasal mid-turbinate (NMT) swab collected by a healthcare professional or by   onsite self-collection (using a flocked tapered swab), or an anterior nares   specimen collected by a healthcare professional or by onsite self-collection   (using a round foam swab). (Centers for  Disease Control)  Testing performed by [facility], Room 1-210, [address] [place] [address], [place] [address]. This test was developed and its   performance characteristics determined by the [facility]   [place]. It has not been cleared or approved by the FDA.  The laboratory is regulated under the Clinical Laboratory Improvement   Amendments of 1988 (CLIA-88) as qualified to perform high-complexity testin  g.   This test is used for clinical purposes. It should not be regarded as   investigational or for research.         If you have any questions or concerns, please call the clinic at the number listed above.       Sincerely,      Dr. Last

## 2020-07-20 NOTE — LETTER
July 22, 2020        Tika DICKENS Maxime  [address]   [facility] [address]    This letter provides a written record that you were tested for COVID-19 on 7/20/20.       Your result was negative. This means that we didn t find the virus that causes COVID-19 in your sample. A test may show negative when you do actually have the virus. This can happen when the virus is in the early stages of infection, before you feel illness symptoms.    If you have symptoms   Stay home and away from others (self-isolate) until you meet ALL of the guidelines below:    You ve had no fever--and no medicine that reduces fever--for 3 full days (72 hours). And      Your other symptoms have gotten better. For example, your cough or breathing has improved. And     At least 10 days have passed since your symptoms started.    During this time:    Stay home. Don t go to work, school or anywhere else.     Stay in your own room, including for meals. Use your own bathroom if you can.    Stay away from others in your home. No hugging, kissing or shaking hands. No visitors.    Clean  high touch  surfaces often (doorknobs, counters, handles, etc.). Use a household cleaning spray or wipes. You can find a full list on the EPA website at www.epa.gov/pesticide-registration/list-n-disinfectants-use-against-sars-cov-2.    Cover your mouth and nose with a mask, tissue or washcloth to avoid spreading germs.    Wash your hands and face often with soap and water.    Going back to work  Check with your employer for any guidelines to follow for going back to work.    Employers: This document serves as formal notice that your employee tested negative for COVID-19, as of the testing date shown above.

## 2020-07-21 ENCOUNTER — TELEPHONE (OUTPATIENT)
Dept: FAMILY MEDICINE | Facility: CLINIC | Age: 70
End: 2020-07-21

## 2020-07-21 NOTE — TELEPHONE ENCOUNTER
Reason for Call:  Other pre op    Detailed comments: Barbara from [facility] calling.  Please fax preop,  labs and ekg from 7/10 to fax # [phone]     Phone Number Patient can be reached at: Other phone number:  [phone] [facility] if questions      Best Time:       Can we leave a detailed message on this number? Not Applicable    Call taken on 7/21/2020 at 1:37 PM by Vane Diaz

## 2020-07-23 ENCOUNTER — ANESTHESIA - HEALTHEAST (OUTPATIENT)
Dept: SURGERY | Facility: CLINIC | Age: 70
End: 2020-07-23

## 2020-07-23 ENCOUNTER — SURGERY - HEALTHEAST (OUTPATIENT)
Dept: SURGERY | Facility: CLINIC | Age: 70
End: 2020-07-23

## 2020-07-23 ENCOUNTER — TRANSFERRED RECORDS (OUTPATIENT)
Dept: HEALTH INFORMATION MANAGEMENT | Facility: CLINIC | Age: 70
End: 2020-07-23

## 2020-07-23 ASSESSMENT — MIFFLIN-ST. JEOR: SCORE: 1531.09

## 2020-08-07 ENCOUNTER — VIRTUAL VISIT (OUTPATIENT)
Dept: FAMILY MEDICINE | Facility: CLINIC | Age: 70
End: 2020-08-07
Payer: COMMERCIAL

## 2020-08-07 ENCOUNTER — TRANSFERRED RECORDS (OUTPATIENT)
Dept: HEALTH INFORMATION MANAGEMENT | Facility: CLINIC | Age: 70
End: 2020-08-07

## 2020-08-07 DIAGNOSIS — R30.0 DYSURIA: Primary | ICD-10-CM

## 2020-08-07 PROCEDURE — 99441 ZZC PHYSICIAN TELEPHONE EVALUATION 5-10 MIN: CPT | Mod: 95 | Performed by: INTERNAL MEDICINE

## 2020-08-07 RX ORDER — NITROFURANTOIN 25; 75 MG/1; MG/1
100 CAPSULE ORAL 2 TIMES DAILY
Qty: 10 CAPSULE | Refills: 0 | Status: SHIPPED | OUTPATIENT
Start: 2020-08-07 | End: 2020-08-24

## 2020-08-07 RX ORDER — ASPIRIN 81 MG/1
81 TABLET ORAL 2 TIMES DAILY
COMMUNITY
Start: 2020-07-23 | End: 2021-08-31

## 2020-08-07 NOTE — PROGRESS NOTES
"Tika Swartz is a 70 year old female who is being evaluated via a billable telephone visit.      The patient has been notified of following:     \"This telephone visit will be conducted via a call between you and your physician/provider. We have found that certain health care needs can be provided without the need for a physical exam.  This service lets us provide the care you need with a short phone conversation.  If a prescription is necessary we can send it directly to your pharmacy.  If lab work is needed we can place an order for that and you can then stop by our lab to have the test done at a later time.    Telephone visits are billed at different rates depending on your insurance coverage. During this emergency period, for some insurers they may be billed the same as an in-person visit.  Please reach out to your insurance provider with any questions.    If during the course of the call the physician/provider feels a telephone visit is not appropriate, you will not be charged for this service.\"    Patient has given verbal consent for Telephone visit?  Yes    What phone number would you like to be contacted at? [phone]    How would you like to obtain your AVS? Basiliot    Subjective     Tika Swartz is a 70 year old female who presents via phone visit today for the following health issues:    She had chills on Sunday and Monday, felt sluggish, urine smelled funny, left lower back ache and tired and achy.  Did have diarrhea Weds am, then gone. Feels pressure and burning with urination.  Less last 2 days.  No fever, temp 98.3 today.  No n/v.  No abdomen pain.  Eating and drinking ok.  No vag b/b discharge.  Last uti long time ago, 3 to 4 years.      Past Medical History:   Diagnosis Date     Adenomatous colon polyp 2006    fu rec 2011, fu done 2016 and polyps, to fu 2019     CA - breast cancer 2006    bilat mastectomy, no chemo or xrt, Dr. Garcia, not seeing onc as of 2015     Fibromyalgia      " Osteoporosis 2016    -1.9 left hip, -0.8 spine, dx based on low impact fx, tried fosamax but gi upset     Past Surgical History:   Procedure Laterality Date     BIOPSY OF BREAST, NEEDLE CORE  11/22/05    right breast     BIOPSY OF BREAST, NEEDLE CORE  12/5/05    left and right breast     C VAGINAL HYSTERECTOMY  2005    prolapsed uterus     fractured wrist Left 2015    low impact     HYSTERECTOMY, PAP STILL INDICATED  2005     left total knee  Left 07/24/2020     MASTECTOMY SIMPLE BILATERAL  2005     Social History     Socioeconomic History     Marital status:      Spouse name: Not on file     Number of children: 2     Years of education: Not on file     Highest education level: Not on file   Occupational History     Occupation: reitred   Social Needs     Financial resource strain: Not on file     Food insecurity     Worry: Not on file     Inability: Not on file     Transportation needs     Medical: Not on file     Non-medical: Not on file   Tobacco Use     Smoking status: Never Smoker     Smokeless tobacco: Never Used   Substance and Sexual Activity     Alcohol use: Yes     Alcohol/week: 0.0 standard drinks     Comment: occasionally     Drug use: No     Sexual activity: Not Currently   Lifestyle     Physical activity     Days per week: Not on file     Minutes per session: Not on file     Stress: Not on file   Relationships     Social connections     Talks on phone: Not on file     Gets together: Not on file     Attends Cheondoism service: Not on file     Active member of club or organization: Not on file     Attends meetings of clubs or organizations: Not on file     Relationship status: Not on file     Intimate partner violence     Fear of current or ex partner: Not on file     Emotionally abused: Not on file     Physically abused: Not on file     Forced sexual activity: Not on file   Other Topics Concern     Parent/sibling w/ CABG, MI or angioplasty before 65F 55M? No   Social History Narrative     Not on  file     Current Outpatient Medications   Medication Sig Dispense Refill     Acetaminophen (TYLENOL EXTRA STRENGTH PO)        aspirin 81 MG EC tablet Take 81 mg by mouth 2 times daily       VITAMIN D, CHOLECALCIFEROL, PO Take 2,000 Units by mouth daily       Allergies   Allergen Reactions     Seasonal Allergies      Eye watering, nasal symptoms     FAMILY HISTORY NOTED AND REVIEWED    REVIEW OF SYSTEMS: above    ASSESSMENT:  Probable uti, uncomplicated, recent tka.      PLAN:  macrobid  Call if symptoms not resolved soon or worsen.    Estuardo Last M.D.  Time 5:15

## 2020-08-24 ENCOUNTER — VIRTUAL VISIT (OUTPATIENT)
Dept: FAMILY MEDICINE | Facility: CLINIC | Age: 70
End: 2020-08-24
Payer: COMMERCIAL

## 2020-08-24 VITALS — TEMPERATURE: 98.3 F | SYSTOLIC BLOOD PRESSURE: 127 MMHG | DIASTOLIC BLOOD PRESSURE: 80 MMHG | HEART RATE: 88 BPM

## 2020-08-24 DIAGNOSIS — R21 RASH AND NONSPECIFIC SKIN ERUPTION: Primary | ICD-10-CM

## 2020-08-24 PROCEDURE — 99213 OFFICE O/P EST LOW 20 MIN: CPT | Mod: 95 | Performed by: INTERNAL MEDICINE

## 2020-08-24 RX ORDER — PREDNISONE 20 MG/1
TABLET ORAL
Qty: 10 TABLET | Refills: 0 | Status: SHIPPED | OUTPATIENT
Start: 2020-08-24 | End: 2021-08-20

## 2020-08-24 RX ORDER — IBUPROFEN 200 MG
200 TABLET ORAL EVERY 4 HOURS PRN
Status: ON HOLD | COMMUNITY
End: 2021-09-10

## 2020-08-24 RX ORDER — FEXOFENADINE HCL 180 MG/1
180 TABLET ORAL DAILY
COMMUNITY
End: 2024-07-02

## 2020-08-24 NOTE — PROGRESS NOTES
"Tika Swartz is a 70 year old female who is being evaluated via a billable video visit.      The patient has been notified of following:     \"This video visit will be conducted via a call between you and your physician/provider. We have found that certain health care needs can be provided without the need for an in-person physical exam.  This service lets us provide the care you need with a video conversation.  If a prescription is necessary we can send it directly to your pharmacy.  If lab work is needed we can place an order for that and you can then stop by our lab to have the test done at a later time.    Video visits are billed at different rates depending on your insurance coverage.  Please reach out to your insurance provider with any questions.    If during the course of the call the physician/provider feels a video visit is not appropriate, you will not be charged for this service.\"    Patient has given verbal consent for Video visit? Yes  How would you like to obtain your AVS? MyChart  If you are dropped from the video visit, the video invite should be resent to: Text to cell phone: [phone]   Will anyone else be joining your video visit? No      Subjective     Tika Swartz is a 70 year old female who presents today via video visit for the following health issues:    The patient has had a rash as of Friday, she notes it has gotten a bit worse over weekend with swelling of upper lip, upper eye lids, no issues with throat or tongue.  She feels fine, no shortness of breath, or chest pain.  No gi or genitourinary issues, no fever.  No new meds, last prescription was macrobid as noted earlier this month.  NO fevers, chills or night sweats.  Has not had this before.  On asa post tka.    Past Medical History:   Diagnosis Date     Adenomatous colon polyp 2006    fu rec 2011, fu done 2016 and polyps, to fu 2019     CA - breast cancer 2006    bilat mastectomy, no chemo or xrt, Dr. Garcia, not seeing " onc as of 2015     Fibromyalgia      Osteoporosis 2016    -1.9 left hip, -0.8 spine, dx based on low impact fx, tried fosamax but gi upset     Past Surgical History:   Procedure Laterality Date     BIOPSY OF BREAST, NEEDLE CORE  11/22/05    right breast     BIOPSY OF BREAST, NEEDLE CORE  12/5/05    left and right breast     C VAGINAL HYSTERECTOMY  2005    prolapsed uterus     fractured wrist Left 2015    low impact     HYSTERECTOMY, PAP STILL INDICATED  2005     left total knee  Left 07/24/2020     MASTECTOMY SIMPLE BILATERAL  2005     Social History     Socioeconomic History     Marital status:      Spouse name: Not on file     Number of children: 2     Years of education: Not on file     Highest education level: Not on file   Occupational History     Occupation: reitred   Social Needs     Financial resource strain: Not on file     Food insecurity     Worry: Not on file     Inability: Not on file     Transportation needs     Medical: Not on file     Non-medical: Not on file   Tobacco Use     Smoking status: Never Smoker     Smokeless tobacco: Never Used   Substance and Sexual Activity     Alcohol use: Yes     Alcohol/week: 0.0 standard drinks     Comment: occasionally     Drug use: No     Sexual activity: Not Currently   Lifestyle     Physical activity     Days per week: Not on file     Minutes per session: Not on file     Stress: Not on file   Relationships     Social connections     Talks on phone: Not on file     Gets together: Not on file     Attends Sabianism service: Not on file     Active member of club or organization: Not on file     Attends meetings of clubs or organizations: Not on file     Relationship status: Not on file     Intimate partner violence     Fear of current or ex partner: Not on file     Emotionally abused: Not on file     Physically abused: Not on file     Forced sexual activity: Not on file   Other Topics Concern     Parent/sibling w/ CABG, MI or angioplasty before 65F 55M? No    Social History Narrative     Not on file     Current Outpatient Medications   Medication Sig Dispense Refill     Acetaminophen (TYLENOL EXTRA STRENGTH PO)        aspirin 81 MG EC tablet Take 81 mg by mouth 2 times daily       fexofenadine (ALLEGRA) 180 MG tablet Take 180 mg by mouth daily       ibuprofen (ADVIL/MOTRIN) 200 MG tablet Take 200 mg by mouth every 4 hours as needed for mild pain       predniSONE (DELTASONE) 20 MG tablet Take 3 tablets today then 2 tomorrow and the next day, then 1 pill after that, then stop it. 10 tablet 0     VITAMIN D, CHOLECALCIFEROL, PO Take 2,000 Units by mouth daily       Allergies   Allergen Reactions     Seasonal Allergies      Eye watering, nasal symptoms     FAMILY HISTORY NOTED AND REVIEWED    REVIEW OF SYSTEMS: above    PHYSICAL EXAM    /80   Pulse 88   Temp 98.3  F (36.8  C) (Oral)   LMP 04/13/2001     Patient appears non toxic          Video Start Time: 8:35 AM         Vitals:  No vitals were obtained today due to virtual visit.    Physical Exam     GENERAL: Healthy, alert and no distress  EYES: Eyes grossly normal to inspection.  No discharge or erythema, or obvious scleral/conjunctival abnormalities.  RESP: No audible wheeze, cough, or visible cyanosis.  No visible retractions or increased work of breathing.    SKIN: upper lip slightly swollen, upper lids with redness in blotches and swollen, some hives on skin  NEURO: Cranial nerves grossly intact.  Mentation and speech appropriate for age.  PSYCH: Mentation appears normal, affect normal/bright, judgement and insight intact, normal speech and appearance well-groomed.    ASSESSMENT:  Hives, not clear as to cause, ?delayed reaction to macrobid    PLAN:  Continue zyrtec she has been on  Tylenol only  Ok to continue asa  Prednisone 60mg now, then 40mg for 2 days then 20mg then discontinue it  To emergency room if worsens, shortness of breath or throat or tongue issues    Estuardo Last M.D.        Video-Visit  Details    Type of service:  Video Visit    Video End Time:8:49 AM    Originating Location (pt. Location): Home    Distant Location (provider location):  [facility]     Platform used for Video Visit: Yamini

## 2020-09-22 ENCOUNTER — TRANSFERRED RECORDS (OUTPATIENT)
Dept: HEALTH INFORMATION MANAGEMENT | Facility: CLINIC | Age: 70
End: 2020-09-22

## 2020-09-25 ENCOUNTER — ALLIED HEALTH/NURSE VISIT (OUTPATIENT)
Dept: FAMILY MEDICINE | Facility: CLINIC | Age: 70
End: 2020-09-25
Payer: COMMERCIAL

## 2020-09-25 DIAGNOSIS — Z23 NEED FOR PROPHYLACTIC VACCINATION AND INOCULATION AGAINST INFLUENZA: Primary | ICD-10-CM

## 2020-09-25 PROCEDURE — 99207 ZZC NO CHARGE NURSE ONLY: CPT

## 2020-09-25 PROCEDURE — 90662 IIV NO PRSV INCREASED AG IM: CPT

## 2020-09-25 PROCEDURE — G0008 ADMIN INFLUENZA VIRUS VAC: HCPCS

## 2021-01-15 ENCOUNTER — HEALTH MAINTENANCE LETTER (OUTPATIENT)
Age: 71
End: 2021-01-15

## 2021-03-01 ENCOUNTER — IMMUNIZATION (OUTPATIENT)
Dept: NURSING | Facility: CLINIC | Age: 71
End: 2021-03-01
Payer: COMMERCIAL

## 2021-03-01 PROCEDURE — 91301 PR COVID VAC MODERNA 100 MCG/0.5 ML IM: CPT

## 2021-03-01 PROCEDURE — 0011A PR COVID VAC MODERNA 100 MCG/0.5 ML IM: CPT

## 2021-03-29 ENCOUNTER — IMMUNIZATION (OUTPATIENT)
Dept: NURSING | Facility: CLINIC | Age: 71
End: 2021-03-29
Attending: INTERNAL MEDICINE
Payer: COMMERCIAL

## 2021-03-29 PROCEDURE — 0012A PR COVID VAC MODERNA 100 MCG/0.5 ML IM: CPT

## 2021-03-29 PROCEDURE — 91301 PR COVID VAC MODERNA 100 MCG/0.5 ML IM: CPT

## 2021-06-04 VITALS — BODY MASS INDEX: 41.21 KG/M2 | HEIGHT: 63 IN | WEIGHT: 232.56 LBS

## 2021-06-09 NOTE — ANESTHESIA PROCEDURE NOTES
Peripheral Block    Patient location during procedure: pre-op  Start time: 7/23/2020 12:32 PM  End time: 7/23/2020 12:37 PM  post-op analgesia per surgeon order as noted in medical record  Staffing:  Performing  Anesthesiologist: Justice Carlos MD  Preanesthetic Checklist  Completed: patient identified, site marked, risks, benefits, and alternatives discussed, timeout performed, consent obtained, airway assessed, oxygen available, suction available, emergency drugs available and hand hygiene performed  Peripheral Block  Block type: saphenous, adductor canal block  Prep: ChloraPrep  Patient position: supine  Patient monitoring: cardiac monitor, continuous pulse oximetry, heart rate and blood pressure  Laterality: left  Injection technique: ultrasound guided    Ultrasound used to visualize needle placement in proximity to nerve being blocked: yes   US used to visualize anesthetic spread  Visualized anatomic structures normal  No Pathological Findings  Permanent ultrasound image captured for medical record  Sterile gel and probe cover used for ultrasound.  Needle  Needle type: Stimuplex   Needle gauge: 21 G  Needle length: 6 in  no peripheral nerve catheter placed  Assessment  Injection assessment: no difficulty with injection, negative aspiration for heme, no paresthesia on injection and incremental injection

## 2021-06-09 NOTE — ANESTHESIA PREPROCEDURE EVALUATION
Anesthesia Evaluation      Patient summary reviewed   No history of anesthetic complications     Airway   Mallampati: II  Neck ROM: full   Pulmonary - negative ROS and normal exam                          Cardiovascular - negative ROS and normal exam   Neuro/Psych - negative ROS     Comments: History of fibromyalgia.    Endo/Other       Comments: History of breast cancer    GI/Hepatic/Renal - negative ROS           Dental - normal exam                        Anesthesia Plan  Planned anesthetic: spinal    ASA 2     Anesthetic plan and risks discussed with: patient    Post-op plan: routine recovery

## 2021-06-09 NOTE — ANESTHESIA CARE TRANSFER NOTE
Last vitals:   Vitals:    07/23/20 1443   BP: 136/63   Pulse: 79   Resp: 14   Temp: 37.1  C (98.7  F)   SpO2: 99%     Patient's level of consciousness is awake  Spontaneous respirations: yes  Maintains airway independently: yes  Dentition unchanged: yes  Oropharynx: oropharynx clear of all foreign objects    QCDR Measures:  ASA# 20 - Surgical Safety Checklist: WHO surgical safety checklist completed prior to induction    PQRS# 430 - Adult PONV Prevention: 4558F - Pt received => 2 anti-emetic agents (different classes) preop & intraop  ASA# 8 - Peds PONV Prevention: NA - Not pediatric patient, not GA or 2 or more risk factors NOT present  PQRS# 424 - Dyana-op Temp Management: 4559F - At least one body temp DOCUMENTED => 35.5C or 95.9F within required timeframe  PQRS# 426 - PACU Transfer Protocol: - Transfer of care checklist used  ASA# 14 - Acute Post-op Pain: ASA14B - Patient did NOT experience pain >= 7 out of 10

## 2021-06-09 NOTE — ANESTHESIA PROCEDURE NOTES
Spinal Block    Patient location during procedure: OR  Start time: 7/23/2020 1:14 PM  End time: 7/23/2020 1:18 PM  Reason for block: primary anesthetic    Staffing:  Performing  Anesthesiologist: Justice Carlos MD    Preanesthetic Checklist  Completed: patient identified, risks, benefits, and alternatives discussed, timeout performed, consent obtained, airway assessed, oxygen available, suction available, emergency drugs available and hand hygiene performed  Spinal Block  Patient position: sitting  Prep: ChloraPrep  Patient monitoring: heart rate, cardiac monitor, continuous pulse ox and blood pressure  Approach: midline  Location: L2-3  Injection technique: single-shot  Needle type: pencil-tip   Needle gauge: 24 G

## 2021-06-09 NOTE — ANESTHESIA POSTPROCEDURE EVALUATION
Patient: Tika Swartz  Procedure(s):  LEFT TOTAL KNEE ARTHROPLASTY (Left)  Anesthesia type: spinal    Patient location: PACU  Last vitals:   Vitals Value Taken Time   /65 7/23/2020  3:10 PM   Temp 37.1  C (98.7  F) 7/23/2020  2:43 PM   Pulse 80 7/23/2020  3:11 PM   Resp 17 7/23/2020  3:11 PM   SpO2 94 % 7/23/2020  3:11 PM   Vitals shown include unvalidated device data.  Post vital signs: stable  Level of consciousness: awake and responds to simple questions  Post-anesthesia pain: pain controlled  Post-anesthesia nausea and vomiting: no  Pulmonary: unassisted, return to baseline  Cardiovascular: stable and blood pressure at baseline  Hydration: adequate  Anesthetic events: no    QCDR Measures:  ASA# 11 - Dyana-op Cardiac Arrest: ASA11B - Patient did NOT experience unanticipated cardiac arrest  ASA# 12 - Dyana-op Mortality Rate: ASA12B - Patient did NOT die  ASA# 13 - PACU Re-Intubation Rate: NA - No ETT / LMA used for case  ASA# 10 - Composite Anes Safety: ASA10A - No serious adverse event    Additional Notes:  Recovery as anticipated from spinal anesthetic.

## 2021-06-22 ENCOUNTER — TRANSFERRED RECORDS (OUTPATIENT)
Dept: HEALTH INFORMATION MANAGEMENT | Facility: CLINIC | Age: 71
End: 2021-06-22

## 2021-06-30 DIAGNOSIS — Z11.59 ENCOUNTER FOR SCREENING FOR OTHER VIRAL DISEASES: ICD-10-CM

## 2021-07-03 NOTE — ADDENDUM NOTE
Addendum Note by Francisco Javier Potter RN at 6/11/2020  2:25 PM     Author: Francisco Javier Potter RN Service: -- Author Type: Registered Nurse    Filed: 6/18/2020 10:59 AM Encounter Date: 6/11/2020 Status: Signed    : Francisco Javier Potter RN (Registered Nurse)    Addended by: FRANCISCO JAVIER POTTER on: 6/18/2020 10:59 AM        Modules accepted: Orders

## 2021-08-20 ASSESSMENT — MIFFLIN-ST. JEOR: SCORE: 1511.52

## 2021-08-31 ENCOUNTER — OFFICE VISIT (OUTPATIENT)
Dept: FAMILY MEDICINE | Facility: CLINIC | Age: 71
End: 2021-08-31
Payer: COMMERCIAL

## 2021-08-31 VITALS
HEART RATE: 80 BPM | DIASTOLIC BLOOD PRESSURE: 84 MMHG | WEIGHT: 238 LBS | SYSTOLIC BLOOD PRESSURE: 124 MMHG | BODY MASS INDEX: 43.53 KG/M2

## 2021-08-31 DIAGNOSIS — R74.8 LOW SERUM HDL: ICD-10-CM

## 2021-08-31 DIAGNOSIS — E66.01 MORBID OBESITY (H): ICD-10-CM

## 2021-08-31 DIAGNOSIS — Z01.818 PRE-OP EXAMINATION: Primary | ICD-10-CM

## 2021-08-31 DIAGNOSIS — C50.919 MALIGNANT NEOPLASM OF FEMALE BREAST, UNSPECIFIED ESTROGEN RECEPTOR STATUS, UNSPECIFIED LATERALITY, UNSPECIFIED SITE OF BREAST (H): ICD-10-CM

## 2021-08-31 DIAGNOSIS — D12.6 ADENOMATOUS POLYP OF COLON, UNSPECIFIED PART OF COLON: ICD-10-CM

## 2021-08-31 DIAGNOSIS — M17.9 OSTEOARTHRITIS OF KNEE, UNSPECIFIED LATERALITY, UNSPECIFIED OSTEOARTHRITIS TYPE: ICD-10-CM

## 2021-08-31 DIAGNOSIS — M81.0 OSTEOPOROSIS, UNSPECIFIED OSTEOPOROSIS TYPE, UNSPECIFIED PATHOLOGICAL FRACTURE PRESENCE: ICD-10-CM

## 2021-08-31 LAB
ERYTHROCYTE [DISTWIDTH] IN BLOOD BY AUTOMATED COUNT: 14 % (ref 10–15)
HCT VFR BLD AUTO: 46.7 % (ref 35–47)
HGB BLD-MCNC: 16 G/DL (ref 11.7–15.7)
MCH RBC QN AUTO: 31.9 PG (ref 26.5–33)
MCHC RBC AUTO-ENTMCNC: 34.3 G/DL (ref 31.5–36.5)
MCV RBC AUTO: 93 FL (ref 78–100)
PLATELET # BLD AUTO: 195 10E3/UL (ref 150–450)
RBC # BLD AUTO: 5.02 10E6/UL (ref 3.8–5.2)
WBC # BLD AUTO: 7 10E3/UL (ref 4–11)

## 2021-08-31 PROCEDURE — 93000 ELECTROCARDIOGRAM COMPLETE: CPT | Performed by: INTERNAL MEDICINE

## 2021-08-31 PROCEDURE — 85027 COMPLETE CBC AUTOMATED: CPT | Performed by: INTERNAL MEDICINE

## 2021-08-31 PROCEDURE — 99214 OFFICE O/P EST MOD 30 MIN: CPT | Performed by: INTERNAL MEDICINE

## 2021-08-31 PROCEDURE — 36415 COLL VENOUS BLD VENIPUNCTURE: CPT | Performed by: INTERNAL MEDICINE

## 2021-08-31 PROCEDURE — 80061 LIPID PANEL: CPT | Performed by: INTERNAL MEDICINE

## 2021-08-31 PROCEDURE — 80048 BASIC METABOLIC PNL TOTAL CA: CPT | Performed by: INTERNAL MEDICINE

## 2021-08-31 NOTE — PROGRESS NOTES
The patient is having surgery for djd of her knee.  To be done at [facility].  Having tka.    She otherwise feels fine, no c/o on review of systems.                Past Medical History:      Past Medical History:   Diagnosis Date     Adenomatous colon polyp 2006    fu rec 2011, fu done 2016 and polyps, to fu 2019     Arthritis      Breast cancer (H)      CA - breast cancer 2006    bilat mastectomy, no chemo or xrt, Dr. Garcia, not seeing onc as of 2015     Fibromyalgia      Lymphedema      Obese      Osteoporosis 2016    -1.9 left hip, -0.8 spine, dx based on low impact fx, tried fosamax but gi upset, fu dexa 2018 stable             Past Surgical History:      Past Surgical History:   Procedure Laterality Date     BIOPSY OF BREAST, NEEDLE CORE  11/22/2005    right breast     BIOPSY OF BREAST, NEEDLE CORE  12/05/2005    left and right breast     C VAGINAL HYSTERECTOMY  2005    prolapsed uterus     fractured wrist Left 2015    low impact     HYSTERECTOMY, PAP STILL INDICATED  2005     left total knee  Left 07/24/2020     MASTECTOMY       MASTECTOMY SIMPLE BILATERAL  2005     WRIST SURGERY Left              Social History:     Social History     Tobacco Use     Smoking status: Never Smoker     Smokeless tobacco: Never Used   Substance Use Topics     Alcohol use: Yes     Alcohol/week: 0.0 standard drinks     Comment: occasionally             Family History:   No family history of bleeding difficulty, anesthesia problems or blood clots.         Allergies:     Allergies   Allergen Reactions     Seasonal Allergies      Eye watering, nasal symptoms             Medications:     Current Outpatient Medications   Medication Sig Dispense Refill     Acetaminophen (TYLENOL EXTRA STRENGTH PO)        fexofenadine (ALLEGRA) 180 MG tablet Take 180 mg by mouth daily       ibuprofen (ADVIL/MOTRIN) 200 MG tablet Take 200 mg by mouth every 4 hours as needed for mild pain       VITAMIN D, CHOLECALCIFEROL, PO Take 2,000 Units by  mouth daily                 Review of Systems:   No history of bleeding difficulty, anesthesia problems or blood clots.  The 10 point Review of Systems is negative other than noted in the HPI           Physical Exam:   Blood pressure 124/84, pulse 80, weight 108 kg (238 lb), last menstrual period 04/13/2001, not currently breastfeeding.    Constitutional: healthy appearing, alert and in no distress  Heent: Normocephalic. Head without obvious masses or lesions. PERRLDC, EOMI. Mouth exam within normal limits: tongue, mucous membranes, posterior pharynx all normal, no lesions or abnormalities seen.  Tm's and canals within normal limits bilaterally. Neck supple, no nuchal rigidity or masses. No supraclavicular, or cervical adenopathy. Thyroid symmetric, no masses.  Cardiovascular: Regular rate and rhythm, no murmer, rub or gallops.  JVP not elevated, no edema.  Carotids within normal limits bilaterally, no bruits.  Respiratory: Normal respiratory effort.  Lungs clear, normal flow, no wheezing or crackles.  Gastrointestinal: Normal active bowel sounds.   Soft, not tender, no masses, guarding or rebound.  No hepatosplenomegaly.   Musculoskeletal: extremities normal, no gross deformities noted.  Skin: no suspicious lesions or rashes   Neurologic: Mental status within normal limits.  Speech fluent.  No gross motor abnormalities and gait intact.  Psychiatric: mentation appears normal and affect normal.         Data:   Labs sent; ekg - nsr, within normal limits.        Assessment:   1. Normal pre op exam, this patient should be low risk for the procedure  2. Morbid obesity  3. Colon polyps, she will call me when ready to do the follow up colon exam  4. Osteopor, to call when ready and do dexa  5. Breast ca, alessandro  6. Low hdl         Plan:   The patient is ok for the procedure  Routine post op dvt prophylaxis  Call me to do dexa and colon when ready post op      Estuardo Last M.D.

## 2021-08-31 NOTE — H&P (VIEW-ONLY)
The patient is having surgery for djd of her knee.  To be done at [facility].  Having tka.    She otherwise feels fine, no c/o on review of systems.                Past Medical History:      Past Medical History:   Diagnosis Date     Adenomatous colon polyp 2006    fu rec 2011, fu done 2016 and polyps, to fu 2019     Arthritis      Breast cancer (H)      [place] - breast cancer 2006    bilat mastectomy, no chemo or xrt, Dr. Garcia, not seeing onc as of 2015     Fibromyalgia      Lymphedema      Obese      Osteoporosis 2016    -1.9 left hip, -0.8 spine, dx based on low impact fx, tried fosamax but gi upset, fu dexa 2018 stable             Past Surgical History:      Past Surgical History:   Procedure Laterality Date     BIOPSY OF BREAST, NEEDLE CORE  11/22/2005    right breast     BIOPSY OF BREAST, NEEDLE CORE  12/05/2005    left and right breast     C VAGINAL HYSTERECTOMY  2005    prolapsed uterus     fractured wrist Left 2015    low impact     HYSTERECTOMY, PAP STILL INDICATED  2005     left total knee  Left 07/24/2020     MASTECTOMY       MASTECTOMY SIMPLE BILATERAL  2005     WRIST SURGERY Left              Social History:     Social History     Tobacco Use     Smoking status: Never Smoker     Smokeless tobacco: Never Used   Substance Use Topics     Alcohol use: Yes     Alcohol/week: 0.0 standard drinks     Comment: occasionally             Family History:   No family history of bleeding difficulty, anesthesia problems or blood clots.         Allergies:     Allergies   Allergen Reactions     Seasonal Allergies      Eye watering, nasal symptoms             Medications:     Current Outpatient Medications   Medication Sig Dispense Refill     Acetaminophen (TYLENOL EXTRA STRENGTH PO)        fexofenadine (ALLEGRA) 180 MG tablet Take 180 mg by mouth daily       ibuprofen (ADVIL/MOTRIN) 200 MG tablet Take 200 mg by mouth every 4 hours as needed for mild pain       VITAMIN D, CHOLECALCIFEROL, PO Take 2,000 Units by  mouth daily                 Review of Systems:   No history of bleeding difficulty, anesthesia problems or blood clots.  The 10 point Review of Systems is negative other than noted in the HPI           Physical Exam:   Blood pressure 124/84, pulse 80, weight 108 kg (238 lb), last menstrual period 04/13/2001, not currently breastfeeding.    Constitutional: healthy appearing, alert and in no distress  Heent: Normocephalic. Head without obvious masses or lesions. PERRLDC, EOMI. Mouth exam within normal limits: tongue, mucous membranes, posterior pharynx all normal, no lesions or abnormalities seen.  Tm's and canals within normal limits bilaterally. Neck supple, no nuchal rigidity or masses. No supraclavicular, or cervical adenopathy. Thyroid symmetric, no masses.  Cardiovascular: Regular rate and rhythm, no murmer, rub or gallops.  JVP not elevated, no edema.  Carotids within normal limits bilaterally, no bruits.  Respiratory: Normal respiratory effort.  Lungs clear, normal flow, no wheezing or crackles.  Gastrointestinal: Normal active bowel sounds.   Soft, not tender, no masses, guarding or rebound.  No hepatosplenomegaly.   Musculoskeletal: extremities normal, no gross deformities noted.  Skin: no suspicious lesions or rashes   Neurologic: Mental status within normal limits.  Speech fluent.  No gross motor abnormalities and gait intact.  Psychiatric: mentation appears normal and affect normal.         Data:   Labs sent; ekg - nsr, within normal limits.        Assessment:   1. Normal pre op exam, this patient should be low risk for the procedure  2. Morbid obesity  3. Colon polyps, she will call me when ready to do the follow up colon exam  4. Osteopor, to call when ready and do dexa  5. Breast ca, alessandro  6. Low hdl         Plan:   The patient is ok for the procedure  Routine post op dvt prophylaxis  Call me to do dexa and colon when ready post op      Estuardo Last M.D.

## 2021-09-01 LAB
ANION GAP SERPL CALCULATED.3IONS-SCNC: 3 MMOL/L (ref 3–14)
BUN SERPL-MCNC: 18 MG/DL (ref 7–30)
CALCIUM SERPL-MCNC: 8.7 MG/DL (ref 8.5–10.1)
CHLORIDE BLD-SCNC: 108 MMOL/L (ref 94–109)
CHOLEST SERPL-MCNC: 188 MG/DL
CO2 SERPL-SCNC: 27 MMOL/L (ref 20–32)
CREAT SERPL-MCNC: 0.98 MG/DL (ref 0.52–1.04)
FASTING STATUS PATIENT QL REPORTED: YES
GFR SERPL CREATININE-BSD FRML MDRD: 58 ML/MIN/1.73M2
GLUCOSE BLD-MCNC: 106 MG/DL (ref 70–99)
HDLC SERPL-MCNC: 50 MG/DL
LDLC SERPL CALC-MCNC: 123 MG/DL
NONHDLC SERPL-MCNC: 138 MG/DL
POTASSIUM BLD-SCNC: 4.2 MMOL/L (ref 3.4–5.3)
SODIUM SERPL-SCNC: 138 MMOL/L (ref 133–144)
TRIGL SERPL-MCNC: 74 MG/DL

## 2021-09-01 NOTE — PROVIDER NOTIFICATION
Update on Discharge Plan:  Talked with Tika today (9/2/21) and she would like to discharge to home on the day of surgery. Her daughter will leave work early to pick her up. She lives with her  and her daughter will also be helping her after surgery. She had her left TKA 7/2020, it went very well, and she feels prepared for this surgery.     Discharge plan according to [facility]:       08/20/21 1035   Discharge Planning   Patient/Family Anticipates Transition to home   Living Arrangements   People in home spouse   Type of Residence Private Residence   Is your private residence a single family home or apartment? Apartment   Once home, are you able to live on one level? Yes   Which level? Main Level   Bathroom Shower/Tub Walk-in shower   Equipment Currently Used at Home raised toilet seat;shower chair;grab bar, tub/shower  (has a cane and walker at home)   Support System   Support Systems Spouse/Significant Other;Children;Friends/Neighbors   Do you have someone available to stay with you one or two nights once you are home? Yes

## 2021-09-01 NOTE — RESULT ENCOUNTER NOTE
It was a please seeing you.  You should be able to view your labs.    Your total cholesterol is fine at 188.  The hdl or good cholesterol is ok at 45 and the ldl or bad is fair at 123.  When you are able please be sure to exercise and try to get your weight down for this.    Your blood salt and blood count are both fine.    If you have any questions please call me.    Estuardo Last M.D.

## 2021-09-04 ENCOUNTER — HEALTH MAINTENANCE LETTER (OUTPATIENT)
Age: 71
End: 2021-09-04

## 2021-09-07 ENCOUNTER — LAB (OUTPATIENT)
Dept: URGENT CARE | Facility: URGENT CARE | Age: 71
End: 2021-09-07
Payer: COMMERCIAL

## 2021-09-07 DIAGNOSIS — Z11.59 ENCOUNTER FOR SCREENING FOR OTHER VIRAL DISEASES: ICD-10-CM

## 2021-09-07 PROCEDURE — U0005 INFEC AGEN DETEC AMPLI PROBE: HCPCS

## 2021-09-07 PROCEDURE — U0003 INFECTIOUS AGENT DETECTION BY NUCLEIC ACID (DNA OR RNA); SEVERE ACUTE RESPIRATORY SYNDROME CORONAVIRUS 2 (SARS-COV-2) (CORONAVIRUS DISEASE [COVID-19]), AMPLIFIED PROBE TECHNIQUE, MAKING USE OF HIGH THROUGHPUT TECHNOLOGIES AS DESCRIBED BY CMS-2020-01-R: HCPCS

## 2021-09-08 LAB — SARS-COV-2 RNA RESP QL NAA+PROBE: NEGATIVE

## 2021-09-09 ENCOUNTER — ANESTHESIA EVENT (OUTPATIENT)
Dept: SURGERY | Facility: CLINIC | Age: 71
End: 2021-09-09
Payer: COMMERCIAL

## 2021-09-09 NOTE — PROGRESS NOTES
I am evaluating this patient for upcoming Right Total Knee Arthroplasty with Dr. Swartz at [facility] on 9/10/21:    Pre-op H&P and labs reviewed: PMH significant for: Morbid Obesity (BMI 42, Wt: 230 lbs), Osteoporosis, Hx of Breast Cancer, Colon Polyps. Appears medically optimized for surgery. Discharge Plan is Home on Day of Surgery with assist of her Daughter. Dr. Treviño is aware of this plan and supports it. Dr. Treviño's PA-C, Abel Munoz, will be sending her discharge medication scripts to her local pharmacy so that she can pick them up the day before surgery. Discharge Plan below:     Update on Discharge Plan:  Talked with Tika today (9/2/21) and she would like to discharge to home on the day of surgery. Her daughter will leave work early to pick her up. She lives with her  and her daughter will also be helping her after surgery. She had her left TKA 7/2020, it went very well, and she feels prepared for this surgery.      Discharge plan according to [facility]:        08/20/21 1035   Discharge Planning   Patient/Family Anticipates Transition to home   Living Arrangements   People in home spouse   Type of Residence Private Residence   Is your private residence a single family home or apartment? Apartment   Once home, are you able to live on one level? Yes   Which level? Main Level   Bathroom Shower/Tub Walk-in shower   Equipment Currently Used at Home raised toilet seat;shower chair;grab bar, tub/shower  (has a cane and walker at home)   Support System   Support Systems Spouse/Significant Other;Children;Friends/Neighbors   Do you have someone available to stay with you one or two nights once you are home? Yes      JAMES Henao, CNP   Advanced Practice Nurse Navigator- Orthopedics  [facility]   Office Phone: [phone]  Direct Fax: [phone]

## 2021-09-10 ENCOUNTER — APPOINTMENT (OUTPATIENT)
Dept: PHYSICAL THERAPY | Facility: CLINIC | Age: 71
End: 2021-09-10
Attending: ORTHOPAEDIC SURGERY
Payer: COMMERCIAL

## 2021-09-10 ENCOUNTER — ANESTHESIA (OUTPATIENT)
Dept: SURGERY | Facility: CLINIC | Age: 71
End: 2021-09-10
Payer: COMMERCIAL

## 2021-09-10 ENCOUNTER — ANCILLARY PROCEDURE (OUTPATIENT)
Dept: ULTRASOUND IMAGING | Facility: CLINIC | Age: 71
End: 2021-09-10
Attending: ANESTHESIOLOGY
Payer: COMMERCIAL

## 2021-09-10 ENCOUNTER — HOSPITAL ENCOUNTER (OUTPATIENT)
Facility: CLINIC | Age: 71
Discharge: HOME | End: 2021-09-10
Attending: ORTHOPAEDIC SURGERY | Admitting: ORTHOPAEDIC SURGERY
Payer: COMMERCIAL

## 2021-09-10 VITALS
WEIGHT: 235.1 LBS | HEART RATE: 76 BPM | DIASTOLIC BLOOD PRESSURE: 71 MMHG | SYSTOLIC BLOOD PRESSURE: 135 MMHG | OXYGEN SATURATION: 97 % | BODY MASS INDEX: 43.26 KG/M2 | RESPIRATION RATE: 16 BRPM | TEMPERATURE: 98.2 F | HEIGHT: 62 IN

## 2021-09-10 DIAGNOSIS — M17.11 PRIMARY OSTEOARTHRITIS OF RIGHT KNEE: Primary | ICD-10-CM

## 2021-09-10 PROCEDURE — 999N000127 HC STATISTIC PERIPHERAL IV START W US GUIDANCE

## 2021-09-10 PROCEDURE — 710N000010 HC RECOVERY PHASE 1, LEVEL 2, PER MIN: Performed by: ORTHOPAEDIC SURGERY

## 2021-09-10 PROCEDURE — 250N000011 HC RX IP 250 OP 636: Performed by: PHYSICIAN ASSISTANT

## 2021-09-10 PROCEDURE — 250N000013 HC RX MED GY IP 250 OP 250 PS 637: Performed by: PHYSICIAN ASSISTANT

## 2021-09-10 PROCEDURE — 250N000011 HC RX IP 250 OP 636: Performed by: NURSE ANESTHETIST, CERTIFIED REGISTERED

## 2021-09-10 PROCEDURE — 272N000001 HC OR GENERAL SUPPLY STERILE: Performed by: ORTHOPAEDIC SURGERY

## 2021-09-10 PROCEDURE — 258N000003 HC RX IP 258 OP 636: Performed by: ANESTHESIOLOGY

## 2021-09-10 PROCEDURE — 97161 PT EVAL LOW COMPLEX 20 MIN: CPT | Mod: GP

## 2021-09-10 PROCEDURE — 250N000011 HC RX IP 250 OP 636: Performed by: ANESTHESIOLOGY

## 2021-09-10 PROCEDURE — 97110 THERAPEUTIC EXERCISES: CPT | Mod: GP

## 2021-09-10 PROCEDURE — C1776 JOINT DEVICE (IMPLANTABLE): HCPCS | Performed by: ORTHOPAEDIC SURGERY

## 2021-09-10 PROCEDURE — 97116 GAIT TRAINING THERAPY: CPT | Mod: GP

## 2021-09-10 PROCEDURE — 258N000001 HC RX 258: Performed by: ORTHOPAEDIC SURGERY

## 2021-09-10 PROCEDURE — 250N000013 HC RX MED GY IP 250 OP 250 PS 637: Performed by: ORTHOPAEDIC SURGERY

## 2021-09-10 PROCEDURE — 250N000009 HC RX 250: Performed by: PHYSICIAN ASSISTANT

## 2021-09-10 PROCEDURE — 250N000009 HC RX 250: Performed by: NURSE ANESTHETIST, CERTIFIED REGISTERED

## 2021-09-10 PROCEDURE — 370N000017 HC ANESTHESIA TECHNICAL FEE, PER MIN: Performed by: ORTHOPAEDIC SURGERY

## 2021-09-10 PROCEDURE — 999N000141 HC STATISTIC PRE-PROCEDURE NURSING ASSESSMENT: Performed by: ORTHOPAEDIC SURGERY

## 2021-09-10 PROCEDURE — 360N000077 HC SURGERY LEVEL 4, PER MIN: Performed by: ORTHOPAEDIC SURGERY

## 2021-09-10 PROCEDURE — 710N000012 HC RECOVERY PHASE 2, PER MINUTE: Performed by: ORTHOPAEDIC SURGERY

## 2021-09-10 PROCEDURE — 278N000051 HC OR IMPLANT GENERAL: Performed by: ORTHOPAEDIC SURGERY

## 2021-09-10 PROCEDURE — 250N000011 HC RX IP 250 OP 636: Performed by: ORTHOPAEDIC SURGERY

## 2021-09-10 PROCEDURE — 250N000009 HC RX 250: Performed by: ORTHOPAEDIC SURGERY

## 2021-09-10 PROCEDURE — 250N000013 HC RX MED GY IP 250 OP 250 PS 637: Performed by: ANESTHESIOLOGY

## 2021-09-10 DEVICE — IMPLANTABLE DEVICE: Type: IMPLANTABLE DEVICE | Site: KNEE | Status: FUNCTIONAL

## 2021-09-10 DEVICE — BONE CEMENT RADIOPAQUE SIMPLEX HV FULL DOSE 6194-1-001: Type: IMPLANTABLE DEVICE | Site: KNEE | Status: FUNCTIONAL

## 2021-09-10 DEVICE — IMP TIBIAL ZIM PSN NP STM 5DEG SZ DR 42-5320-067-02: Type: IMPLANTABLE DEVICE | Site: KNEE | Status: FUNCTIONAL

## 2021-09-10 DEVICE — IMP PATELLA ZIM KNEE ALL POLLY 32MM 42-5400-000-32: Type: IMPLANTABLE DEVICE | Site: KNEE | Status: FUNCTIONAL

## 2021-09-10 RX ORDER — KETAMINE HYDROCHLORIDE 50 MG/ML
INJECTION, SOLUTION INTRAMUSCULAR; INTRAVENOUS PRN
Status: DISCONTINUED | OUTPATIENT
Start: 2021-09-10 | End: 2021-09-10

## 2021-09-10 RX ORDER — CEFAZOLIN SODIUM 2 G/100ML
2 INJECTION, SOLUTION INTRAVENOUS EVERY 8 HOURS
Status: DISCONTINUED | OUTPATIENT
Start: 2021-09-10 | End: 2021-09-10 | Stop reason: HOSPADM

## 2021-09-10 RX ORDER — HYDROMORPHONE HCL IN WATER/PF 6 MG/30 ML
0.2 PATIENT CONTROLLED ANALGESIA SYRINGE INTRAVENOUS
Status: CANCELLED | OUTPATIENT
Start: 2021-09-10

## 2021-09-10 RX ORDER — CEFAZOLIN SODIUM 2 G/100ML
2 INJECTION, SOLUTION INTRAVENOUS
Status: COMPLETED | OUTPATIENT
Start: 2021-09-10 | End: 2021-09-10

## 2021-09-10 RX ORDER — ONDANSETRON 4 MG/1
4 TABLET, ORALLY DISINTEGRATING ORAL EVERY 30 MIN PRN
Status: DISCONTINUED | OUTPATIENT
Start: 2021-09-10 | End: 2021-09-10 | Stop reason: HOSPADM

## 2021-09-10 RX ORDER — HYDROMORPHONE HCL IN WATER/PF 6 MG/30 ML
0.4 PATIENT CONTROLLED ANALGESIA SYRINGE INTRAVENOUS
Status: CANCELLED | OUTPATIENT
Start: 2021-09-10

## 2021-09-10 RX ORDER — OXYCODONE HYDROCHLORIDE 5 MG/1
10 TABLET ORAL EVERY 4 HOURS PRN
Status: CANCELLED | OUTPATIENT
Start: 2021-09-10

## 2021-09-10 RX ORDER — ACETAMINOPHEN 325 MG/1
975 TABLET ORAL EVERY 8 HOURS
Status: DISCONTINUED | OUTPATIENT
Start: 2021-09-10 | End: 2021-09-10 | Stop reason: HOSPADM

## 2021-09-10 RX ORDER — HYDROXYZINE HYDROCHLORIDE 10 MG/1
10 TABLET, FILM COATED ORAL EVERY 6 HOURS PRN
Status: CANCELLED | OUTPATIENT
Start: 2021-09-10

## 2021-09-10 RX ORDER — DEXAMETHASONE SODIUM PHOSPHATE 10 MG/ML
INJECTION, SOLUTION INTRAMUSCULAR; INTRAVENOUS PRN
Status: DISCONTINUED | OUTPATIENT
Start: 2021-09-10 | End: 2021-09-10

## 2021-09-10 RX ORDER — OXYCODONE HYDROCHLORIDE 5 MG/1
5 TABLET ORAL EVERY 4 HOURS PRN
Status: COMPLETED | OUTPATIENT
Start: 2021-09-10 | End: 2021-09-10

## 2021-09-10 RX ORDER — LIDOCAINE 40 MG/G
CREAM TOPICAL
Status: CANCELLED | OUTPATIENT
Start: 2021-09-10

## 2021-09-10 RX ORDER — ACETAMINOPHEN 325 MG/1
650 TABLET ORAL EVERY 4 HOURS PRN
Status: CANCELLED | OUTPATIENT
Start: 2021-09-13

## 2021-09-10 RX ORDER — ONDANSETRON 2 MG/ML
4 INJECTION INTRAMUSCULAR; INTRAVENOUS EVERY 6 HOURS PRN
Status: CANCELLED | OUTPATIENT
Start: 2021-09-10

## 2021-09-10 RX ORDER — FENTANYL CITRATE 50 UG/ML
25 INJECTION, SOLUTION INTRAMUSCULAR; INTRAVENOUS EVERY 5 MIN PRN
Status: DISPENSED | OUTPATIENT
Start: 2021-09-10 | End: 2021-09-10

## 2021-09-10 RX ORDER — HALOPERIDOL 5 MG/ML
1 INJECTION INTRAMUSCULAR
Status: DISCONTINUED | OUTPATIENT
Start: 2021-09-10 | End: 2021-09-10 | Stop reason: HOSPADM

## 2021-09-10 RX ORDER — CEFAZOLIN SODIUM 2 G/100ML
2 INJECTION, SOLUTION INTRAVENOUS SEE ADMIN INSTRUCTIONS
Status: DISCONTINUED | OUTPATIENT
Start: 2021-09-10 | End: 2021-09-10 | Stop reason: HOSPADM

## 2021-09-10 RX ORDER — SODIUM CHLORIDE, SODIUM LACTATE, POTASSIUM CHLORIDE, CALCIUM CHLORIDE 600; 310; 30; 20 MG/100ML; MG/100ML; MG/100ML; MG/100ML
INJECTION, SOLUTION INTRAVENOUS CONTINUOUS
Status: CANCELLED | OUTPATIENT
Start: 2021-09-10

## 2021-09-10 RX ORDER — ONDANSETRON 2 MG/ML
4 INJECTION INTRAMUSCULAR; INTRAVENOUS EVERY 30 MIN PRN
Status: DISCONTINUED | OUTPATIENT
Start: 2021-09-10 | End: 2021-09-10 | Stop reason: HOSPADM

## 2021-09-10 RX ORDER — LIDOCAINE 40 MG/G
CREAM TOPICAL
Status: DISCONTINUED | OUTPATIENT
Start: 2021-09-10 | End: 2021-09-10 | Stop reason: HOSPADM

## 2021-09-10 RX ORDER — SODIUM CHLORIDE, SODIUM LACTATE, POTASSIUM CHLORIDE, CALCIUM CHLORIDE 600; 310; 30; 20 MG/100ML; MG/100ML; MG/100ML; MG/100ML
INJECTION, SOLUTION INTRAVENOUS CONTINUOUS
Status: DISCONTINUED | OUTPATIENT
Start: 2021-09-10 | End: 2021-09-10 | Stop reason: HOSPADM

## 2021-09-10 RX ORDER — LABETALOL HYDROCHLORIDE 5 MG/ML
5 INJECTION, SOLUTION INTRAVENOUS EVERY 10 MIN PRN
Status: DISCONTINUED | OUTPATIENT
Start: 2021-09-10 | End: 2021-09-10 | Stop reason: HOSPADM

## 2021-09-10 RX ORDER — PROPOFOL 10 MG/ML
INJECTION, EMULSION INTRAVENOUS CONTINUOUS PRN
Status: DISCONTINUED | OUTPATIENT
Start: 2021-09-10 | End: 2021-09-10

## 2021-09-10 RX ORDER — TRANEXAMIC ACID 650 MG/1
1950 TABLET ORAL ONCE
Status: COMPLETED | OUTPATIENT
Start: 2021-09-10 | End: 2021-09-10

## 2021-09-10 RX ORDER — MAGNESIUM SULFATE 4 G/50ML
4 INJECTION INTRAVENOUS ONCE
Status: COMPLETED | OUTPATIENT
Start: 2021-09-10 | End: 2021-09-10

## 2021-09-10 RX ORDER — ACETAMINOPHEN 325 MG/1
975 TABLET ORAL EVERY 4 HOURS PRN
Status: DISCONTINUED | OUTPATIENT
Start: 2021-09-10 | End: 2021-09-10 | Stop reason: HOSPADM

## 2021-09-10 RX ORDER — MAGNESIUM SULFATE 4 G/50ML
4 INJECTION INTRAVENOUS ONCE
Status: DISCONTINUED | OUTPATIENT
Start: 2021-09-10 | End: 2021-09-10 | Stop reason: HOSPADM

## 2021-09-10 RX ORDER — FENTANYL CITRATE 50 UG/ML
50 INJECTION, SOLUTION INTRAMUSCULAR; INTRAVENOUS
Status: DISCONTINUED | OUTPATIENT
Start: 2021-09-10 | End: 2021-09-10 | Stop reason: HOSPADM

## 2021-09-10 RX ORDER — POLYETHYLENE GLYCOL 3350 17 G/17G
17 POWDER, FOR SOLUTION ORAL DAILY
Status: CANCELLED | OUTPATIENT
Start: 2021-09-11

## 2021-09-10 RX ORDER — ACETAMINOPHEN 500 MG
1000 TABLET ORAL EVERY 8 HOURS PRN
COMMUNITY
End: 2023-02-09

## 2021-09-10 RX ORDER — LIDOCAINE HYDROCHLORIDE 10 MG/ML
INJECTION, SOLUTION INFILTRATION; PERINEURAL PRN
Status: DISCONTINUED | OUTPATIENT
Start: 2021-09-10 | End: 2021-09-10

## 2021-09-10 RX ORDER — ONDANSETRON 2 MG/ML
INJECTION INTRAMUSCULAR; INTRAVENOUS PRN
Status: DISCONTINUED | OUTPATIENT
Start: 2021-09-10 | End: 2021-09-10

## 2021-09-10 RX ORDER — HYDROMORPHONE HCL IN WATER/PF 6 MG/30 ML
0.2 PATIENT CONTROLLED ANALGESIA SYRINGE INTRAVENOUS EVERY 5 MIN PRN
Status: DISCONTINUED | OUTPATIENT
Start: 2021-09-10 | End: 2021-09-10 | Stop reason: HOSPADM

## 2021-09-10 RX ORDER — AMOXICILLIN 250 MG
1 CAPSULE ORAL 2 TIMES DAILY
Status: CANCELLED | OUTPATIENT
Start: 2021-09-10

## 2021-09-10 RX ORDER — ONDANSETRON 4 MG/1
4 TABLET, ORALLY DISINTEGRATING ORAL EVERY 6 HOURS PRN
Status: CANCELLED | OUTPATIENT
Start: 2021-09-10

## 2021-09-10 RX ORDER — PROCHLORPERAZINE MALEATE 5 MG
5 TABLET ORAL EVERY 6 HOURS PRN
Status: CANCELLED | OUTPATIENT
Start: 2021-09-10

## 2021-09-10 RX ORDER — ASPIRIN 81 MG/1
81 TABLET ORAL 2 TIMES DAILY
Status: CANCELLED | OUTPATIENT
Start: 2021-09-10

## 2021-09-10 RX ORDER — GLYCOPYRROLATE 0.2 MG/ML
INJECTION, SOLUTION INTRAMUSCULAR; INTRAVENOUS PRN
Status: DISCONTINUED | OUTPATIENT
Start: 2021-09-10 | End: 2021-09-10

## 2021-09-10 RX ORDER — MAGNESIUM HYDROXIDE 1200 MG/15ML
LIQUID ORAL PRN
Status: DISCONTINUED | OUTPATIENT
Start: 2021-09-10 | End: 2021-09-10 | Stop reason: HOSPADM

## 2021-09-10 RX ORDER — OXYCODONE HYDROCHLORIDE 5 MG/1
5 TABLET ORAL EVERY 4 HOURS PRN
Status: CANCELLED | OUTPATIENT
Start: 2021-09-10

## 2021-09-10 RX ORDER — BISACODYL 10 MG
10 SUPPOSITORY, RECTAL RECTAL DAILY PRN
Status: CANCELLED | OUTPATIENT
Start: 2021-09-10

## 2021-09-10 RX ORDER — FENTANYL CITRATE 50 UG/ML
25-50 INJECTION, SOLUTION INTRAMUSCULAR; INTRAVENOUS EVERY 5 MIN PRN
Status: DISCONTINUED | OUTPATIENT
Start: 2021-09-10 | End: 2021-09-10 | Stop reason: HOSPADM

## 2021-09-10 RX ADMIN — TRANEXAMIC ACID 1950 MG: 650 TABLET ORAL at 06:26

## 2021-09-10 RX ADMIN — ONDANSETRON 4 MG: 2 INJECTION INTRAMUSCULAR; INTRAVENOUS at 08:34

## 2021-09-10 RX ADMIN — KETAMINE HYDROCHLORIDE 25 MG: 50 INJECTION, SOLUTION INTRAMUSCULAR; INTRAVENOUS at 07:46

## 2021-09-10 RX ADMIN — MEPIVACAINE HYDROCHLORIDE 3 ML: 10 INJECTION, SOLUTION EPIDURAL; INFILTRATION at 07:40

## 2021-09-10 RX ADMIN — PROPOFOL 75 MCG/KG/MIN: 10 INJECTION, EMULSION INTRAVENOUS at 07:47

## 2021-09-10 RX ADMIN — CEFAZOLIN SODIUM 2 G: 2 INJECTION, SOLUTION INTRAVENOUS at 13:13

## 2021-09-10 RX ADMIN — SODIUM CHLORIDE, POTASSIUM CHLORIDE, SODIUM LACTATE AND CALCIUM CHLORIDE: 600; 310; 30; 20 INJECTION, SOLUTION INTRAVENOUS at 06:26

## 2021-09-10 RX ADMIN — LIDOCAINE HYDROCHLORIDE 30 MG: 10 INJECTION, SOLUTION INFILTRATION; PERINEURAL at 07:34

## 2021-09-10 RX ADMIN — SODIUM CHLORIDE, POTASSIUM CHLORIDE, SODIUM LACTATE AND CALCIUM CHLORIDE: 600; 310; 30; 20 INJECTION, SOLUTION INTRAVENOUS at 09:00

## 2021-09-10 RX ADMIN — KETAMINE HYDROCHLORIDE 25 MG: 50 INJECTION, SOLUTION INTRAMUSCULAR; INTRAVENOUS at 07:51

## 2021-09-10 RX ADMIN — OXYCODONE HYDROCHLORIDE 5 MG: 5 TABLET ORAL at 10:07

## 2021-09-10 RX ADMIN — CEFAZOLIN SODIUM 2 G: 2 INJECTION, SOLUTION INTRAVENOUS at 07:19

## 2021-09-10 RX ADMIN — MIDAZOLAM HYDROCHLORIDE 1 MG: 1 INJECTION, SOLUTION INTRAMUSCULAR; INTRAVENOUS at 07:06

## 2021-09-10 RX ADMIN — MIDAZOLAM HYDROCHLORIDE 1 MG: 1 INJECTION, SOLUTION INTRAMUSCULAR; INTRAVENOUS at 07:05

## 2021-09-10 RX ADMIN — PROPOFOL 100 MCG/KG/MIN: 10 INJECTION, EMULSION INTRAVENOUS at 07:42

## 2021-09-10 RX ADMIN — DEXAMETHASONE SODIUM PHOSPHATE 10 MG: 10 INJECTION, SOLUTION INTRAMUSCULAR; INTRAVENOUS at 07:44

## 2021-09-10 RX ADMIN — MAGNESIUM SULFATE HEPTAHYDRATE 4 G: 80 INJECTION, SOLUTION INTRAVENOUS at 06:26

## 2021-09-10 RX ADMIN — FENTANYL CITRATE 50 MCG: 50 INJECTION, SOLUTION INTRAMUSCULAR; INTRAVENOUS at 07:05

## 2021-09-10 RX ADMIN — GLYCOPYRROLATE 0.2 MG: 0.2 INJECTION, SOLUTION INTRAMUSCULAR; INTRAVENOUS at 07:44

## 2021-09-10 RX ADMIN — FENTANYL CITRATE 25 MCG: 50 INJECTION, SOLUTION INTRAMUSCULAR; INTRAVENOUS at 10:05

## 2021-09-10 RX ADMIN — ACETAMINOPHEN 975 MG: 325 TABLET ORAL at 10:53

## 2021-09-10 RX ADMIN — FENTANYL CITRATE 25 MCG: 50 INJECTION, SOLUTION INTRAMUSCULAR; INTRAVENOUS at 10:26

## 2021-09-10 ASSESSMENT — MIFFLIN-ST. JEOR: SCORE: 1534.66

## 2021-09-10 NOTE — PROGRESS NOTES
[facility]      OUTPATIENT PHYSICAL THERAPY EVALUATION  PLAN OF TREATMENT FOR OUTPATIENT REHABILITATION  (COMPLETE FOR INITIAL CLAIMS ONLY)  Patient's Last Name, First Name, M.I.  YOB: 1950  Tika Swartz                        Provider's Name  [facility] Medical Record No.  [id number]                               Onset Date:  09/10/21   Start of Care Date:  (P) 09/10/21      Type:     _X_PT   ___OT   ___SLP Medical Diagnosis:  (P) R TKA                        PT Diagnosis:  impaired functional mobility   Visits from SOC:  1   _________________________________________________________________________________  Plan of Treatment/Functional Goals    Planned Interventions: gait training, home exercise program, strengthening, transfer training     Goals: See Physical Therapy Goals on Care Plan in [facility] electronic health record.    Therapy Frequency: One time eval and treatment only  Predicted Duration of Therapy Intervention: 1 day  _________________________________________________________________________________    I CERTIFY THE NEED FOR THESE SERVICES FURNISHED UNDER        THIS PLAN OF TREATMENT AND WHILE UNDER MY CARE     (Physician co-signature of this document indicates review and certification of the therapy plan).                Certification date from: (P) 09/10/21, Certification date to: (P) 09/17/21    Referring Physician: Delta Treviño            Initial Assessment        See Physical Therapy evaluation dated (P) 09/10/21 in Epic electronic health record.

## 2021-09-10 NOTE — ANESTHESIA POSTPROCEDURE EVALUATION
Patient: Tika Swartz    Procedure(s):  TOTAL KNEE ARTHROPLASTY right    Diagnosis:Knee osteoarthritis [M17.10]  Diagnosis Additional Information: No value filed.    Anesthesia Type:  Spinal    Note:     Postop Pain Control: Uneventful            Sign Out: Well controlled pain   PONV: No   Neuro/Psych: Uneventful            Sign Out: Acceptable/Baseline neuro status   Airway/Respiratory: Uneventful            Sign Out: Acceptable/Baseline resp. status   CV/Hemodynamics: Uneventful            Sign Out: Acceptable CV status; No obvious hypovolemia; No obvious fluid overload   Other NRE: NONE   DID A NON-ROUTINE EVENT OCCUR? No           Last vitals:  Vitals Value Taken Time   /66 09/10/21 0945   Temp 36.7  C (98  F) 09/10/21 0907   Pulse 73 09/10/21 0941   Resp 26 09/10/21 0941   SpO2 98 % 09/10/21 0942   Vitals shown include unvalidated device data.    Electronically Signed By: Wilma Clark MD  September 10, 2021  11:14 AM

## 2021-09-10 NOTE — ANESTHESIA PREPROCEDURE EVALUATION
Anesthesia Pre-Procedure Evaluation    Patient: Tika Swartz   MRN: [id number] : 1950        Preoperative Diagnosis: Knee osteoarthritis [M17.10]   Procedure : Procedure(s):  TOTAL KNEE ARTHROPLASTY     Past Medical History:   Diagnosis Date     Adenomatous colon polyp 2006    fu rec , fu done  and polyps, to fu 2019     Arthritis      Breast cancer (H) 2006    bilat mastectomy, no chemo or xrt, Dr. Garcia, not seeing onc as of      Fibromyalgia      Gastroesophageal reflux disease      Lymphedema      Motion sickness      Obese      Osteoporosis 2016    -1.9 left hip, -0.8 spine, dx based on low impact fx, tried fosamax but gi upset, fu dexa 2018 stable     PONV (postoperative nausea and vomiting)       Past Surgical History:   Procedure Laterality Date     BIOPSY OF BREAST, NEEDLE CORE  2005    right breast     BIOPSY OF BREAST, NEEDLE CORE  2005    left and right breast     C VAGINAL HYSTERECTOMY      prolapsed uterus     fractured wrist Left 2015    low impact     HYSTERECTOMY, PAP STILL INDICATED       left total knee  Left 2020     MASTECTOMY       MASTECTOMY SIMPLE BILATERAL       ORTHOPEDIC SURGERY       WRIST SURGERY Left       Allergies   Allergen Reactions     Seasonal Allergies      Eye watering, nasal symptoms      Social History     Tobacco Use     Smoking status: Never Smoker     Smokeless tobacco: Never Used   Substance Use Topics     Alcohol use: Yes     Alcohol/week: 0.0 standard drinks     Comment: occasionally      Wt Readings from Last 1 Encounters:   09/10/21 106.6 kg (235 lb 1.6 oz)        Anesthesia Evaluation   Pt has had prior anesthetic.     History of anesthetic complications  - PONV.      ROS/MED HX  ENT/Pulmonary:     (+) EMILY risk factors,     Neurologic:  - neg neurologic ROS     Cardiovascular:     (+) Dyslipidemia -----    METS/Exercise Tolerance: 3 - Able to walk 1-2 blocks without stopping    Hematologic:  - neg hematologic  ROS      Musculoskeletal:   (+) arthritis,     GI/Hepatic:     (+) GERD,     Renal/Genitourinary:  - neg Renal ROS     Endo:     (+) Obesity,     Psychiatric/Substance Use:       Infectious Disease:       Malignancy:   (+) Malignancy,     Other:      (+) , H/O Chronic Pain,        Physical Exam    Airway        Mallampati: III   TM distance: > 3 FB   Neck ROM: full     Respiratory Devices and Support         Dental       (+) chipped      Cardiovascular          Rhythm and rate: regular     Pulmonary   pulmonary exam normal                OUTSIDE LABS:  CBC:   Lab Results   Component Value Date    WBC 7.0 08/31/2021    WBC 6.9 07/10/2020    HGB 16.0 (H) 08/31/2021    HGB 13.1 07/24/2020    HCT 46.7 08/31/2021    HCT 44.5 07/10/2020     08/31/2021     07/10/2020     BMP:   Lab Results   Component Value Date     08/31/2021     07/10/2020    POTASSIUM 4.2 08/31/2021    POTASSIUM 3.8 07/10/2020    CHLORIDE 108 08/31/2021    CHLORIDE 108 07/10/2020    CO2 27 08/31/2021    CO2 27 07/10/2020    BUN 18 08/31/2021    BUN 18 07/10/2020    CR 0.98 08/31/2021    CR 0.85 07/10/2020     (H) 08/31/2021     (H) 07/10/2020     COAGS: No results found for: PTT, INR, FIBR  POC: No results found for: BGM, HCG, HCGS  HEPATIC:   Lab Results   Component Value Date    ALBUMIN 3.5 09/14/2018    PROTTOTAL 7.2 09/14/2018    ALT 17 09/14/2018    AST 17 09/14/2018    ALKPHOS 96 09/14/2018    BILITOTAL 0.7 09/14/2018     OTHER:   Lab Results   Component Value Date    CHESTER 8.7 08/31/2021       Anesthesia Plan    ASA Status:  3   NPO Status:  NPO Appropriate    Anesthesia Type: Spinal.      Maintenance: TIVA.        Consents    Anesthesia Plan(s) and associated risks, benefits, and realistic alternatives discussed. Questions answered and patient/representative(s) expressed understanding.     - Discussed with:  Patient      - Patient is DNR/DNI Status: No         Postoperative Care    Pain management: Peripheral  nerve block (Single Shot), Multi-modal analgesia.   PONV prophylaxis: Ondansetron (or other 5HT-3), Dexamethasone or Solumedrol     Comments:                Wilma Clark MD

## 2021-09-10 NOTE — DISCHARGE SUMMARY
ORTHOPEDIC DISCHARGE SUMMARY       Tika Swartz,  1950, MRN [id number]    Admission Date: 9/10/2021  Admission Diagnoses: Knee osteoarthritis [M17.10]     Discharge Date:  9/10/21    Post-operative Day:  Day of Surgery    Reason for Admission: The patient was admitted for the following:  Procedure(s):  TOTAL KNEE ARTHROPLASTY right      BRIEF HOSPITAL COURSE   This 71 year old female underwent the aforementioned procedure with Dr. Treviño on 9/10/21. There were no intraoperative complications and the patient was transferred to the recovery room and later the orthopedic unit in stable condition. Once the patient reached the orthopedic floor our orthopedic pain protocol was implemented along with the following:    Anticoagulation Medications: ASA  Therapy: PT  Activity: WBAT  Bracing: None    Consultations during Admission: Hospitalist service for medical management     COMPLICATIONS/SIGNIFICANT FINDINGS    None    DISCHARGE INFORMATION   Condition at discharge: Good  Discharge destination: Home  Patient was seen by myself on the date of discharge.    FOLLOW UP CARE   Follow up with orthopedics in 2 weeks or sooner should the need arise. Ortho will continue to manage pain control, post op anticoagulation and incision care.     Follow up with your PCP for management of chronic medical problems and to evaluate for post op medical complications including constipation, nausea/vomiting, DVT/PE, anemia, changes in blood pressure, fevers/chills, urinary retention and atelectasis/pneumonia.     Subjective   Patient is doing well today. Patient has passed HER therapies. SHE is using OXYCODONE for pain which SHE is tolerating well. She received her prescriptions from Dr. Treviño's team prior to surgery and has them ready at home. SHE feels ready to discharge home. No other complaints. All questions answered.     Physical Exam   The patient is A&Ox3.  Sensation is intact.  Dorsiflexion and plantar flexion is  "intact.  Dorsalis pedis pulse intact.  Calves are soft and non-tender.   The incision is covered. Dressing C/D/I    /60   Pulse 76   Temp 98  F (36.7  C)   Resp 16   Ht 1.575 m (5' 2\")   Wt 106.6 kg (235 lb 1.6 oz)   LMP 04/13/2001   SpO2 97%   BMI 43.00 kg/m      Pertinent Results at Discharge     Hemoglobin   Date/Time Value Ref Range Status   08/31/2021 10:46 AM 16.0 (H) 11.7 - 15.7 g/dL Final   07/24/2020 05:35 AM 13.1 12.0 - 16.0 g/dL Final   07/10/2020 09:42 AM 15.1 11.7 - 15.7 g/dL Final   09/14/2018 11:47 AM 15.4 11.7 - 15.7 g/dL Final   09/29/2016 08:55 AM 15.9 (H) 11.7 - 15.7 g/dL Final     Platelet Count   Date/Time Value Ref Range Status   08/31/2021 10:46  150 - 450 10e3/uL Final   07/10/2020 09:42  150 - 450 10e9/L Final   09/14/2018 11:47  150 - 450 10e9/L Final   09/29/2016 08:55  150 - 450 10e9/L Final       Medications at Discharge      Michaela Swartz   Home Medication Instructions JOSE:[id number]    Printed on:09/10/21 2523   Medication Information                      acetaminophen (TYLENOL) 500 MG tablet  Take 1,000 mg by mouth every 8 hours as needed for mild pain             fexofenadine (ALLEGRA) 180 MG tablet  Take 180 mg by mouth daily             VITAMIN D, CHOLECALCIFEROL, PO  Take 2,000 Units by mouth daily                 Problem List   Active Problems:    * No active hospital problems. *        Dayanna Ordoñez PA-C, SMITA  Date: 9/10/2021  Time: 1:37 PM    "

## 2021-09-10 NOTE — PLAN OF CARE
Physical Therapy Discharge Summary    Reason for therapy discharge:    Discharged to home with outpatient therapy.    Progress towards therapy goal(s). See goals on Care Plan in [facility] electronic health record for goal details.  Goals partially met.  Barriers to achieving goals:   discharge on same date as initial evaluation.    Therapy recommendation(s):    Continued therapy is recommended.  Rationale/Recommendations:  needs further rehab for TKA.

## 2021-09-10 NOTE — ANESTHESIA POSTPROCEDURE EVALUATION
Patient: Tika Swartz    Procedure(s):  TOTAL KNEE ARTHROPLASTY right    Diagnosis:Knee osteoarthritis [M17.10]  Diagnosis Additional Information: No value filed.    Anesthesia Type:  Spinal    Note:     Postop Pain Control: Uneventful            Sign Out: Well controlled pain   PONV: No   Neuro/Psych: Uneventful            Sign Out: Acceptable/Baseline neuro status   Airway/Respiratory: Uneventful            Sign Out: Acceptable/Baseline resp. status   CV/Hemodynamics: Uneventful            Sign Out: Acceptable CV status; No obvious hypovolemia; No obvious fluid overload   Other NRE: NONE   DID A NON-ROUTINE EVENT OCCUR? No           Last vitals:  Vitals Value Taken Time   /66 09/10/21 0945   Temp 36.7  C (98  F) 09/10/21 0907   Pulse 73 09/10/21 0941   Resp 26 09/10/21 0941   SpO2 98 % 09/10/21 0942   Vitals shown include unvalidated device data.    Electronically Signed By: Wilma Clark MD  September 10, 2021  11:13 AM

## 2021-09-10 NOTE — OR NURSING
"Ambulated to bathroom and back with 2 wheeled walker, tolerated activity well.  States knee feels better than first time up, still a little \"buckly\" but able to bear weight without problems.  Voided large amount.  "

## 2021-09-10 NOTE — ANESTHESIA CARE TRANSFER NOTE
Patient: Tika Swartz    Procedure(s):  TOTAL KNEE ARTHROPLASTY right    Diagnosis: Knee osteoarthritis [M17.10]  Diagnosis Additional Information: No value filed.    Anesthesia Type:   Spinal     Note:    Oropharynx: oropharynx clear of all foreign objects  Level of Consciousness: awake  Oxygen Supplementation: face mask  Level of Supplemental Oxygen (L/min / FiO2): 5  Independent Airway: airway patency satisfactory and stable  Dentition: dentition unchanged  Vital Signs Stable: post-procedure vital signs reviewed and stable  Report to RN Given: handoff report given  Patient transferred to: PACU    Handoff Report: Identifed the Patient, Identified the Reponsible Provider, Reviewed the pertinent medical history, Discussed the surgical course, Reviewed Intra-OP anesthesia mangement and issues during anesthesia, Set expectations for post-procedure period and Allowed opportunity for questions and acknowledgement of understanding      Vitals:  Vitals Value Taken Time   /66 09/10/21 0908   Temp 36.7  C (98  F) 09/10/21 0907   Pulse 78 09/10/21 0909   Resp 27 09/10/21 0909   SpO2 100 % 09/10/21 0909   Vitals shown include unvalidated device data.    Electronically Signed By: JAMES Bridges CRNA  September 10, 2021  9:10 AM

## 2021-09-10 NOTE — ADDENDUM NOTE
Addendum  created 09/10/21 1124 by Wilma Clark MD    Clinical Note Signed, Intraprocedure Blocks edited

## 2021-09-10 NOTE — OP NOTE
Operative Report    PATIENT:  Tika Swartz    DATE OF SURGERY:  9/10/2021    SURGEON  Delta Treviño MD.      FIRST ASSISTANT  Bryce Munoz PA-C  (Expert SMITA assist was required throughout for patient positioning, soft tissue retraction, appropriate use of knee instrumentation, and patient safety)     PREOPERATIVE DIAGNOSIS  right knee osteoarthritis     POSTOPERATIVE DIAGNOSIS  right knee osteoarthritis.         PROCEDURE  right Total Knee Arthroplasty.         ANESTHESIA  Spinal    SPECIMENS: none     ESTIMATED BLOOD LOSS  50cc     INDICATIONS  Ms. Tika Swartz is a pleasant 71 year old-year-old female with an ongoing history of increasing and progressive pain in the right knee with severe disability. Pain and disability due to knee arthritis are severely affecting quality of life and ability to perform even simple activities of daily living.  X-rays have shown bone-on-bone degenerative change. Consequently after trying and failing all conservative management of knee arthritis, discussion regarding the risk and benefits of knee replacement was undertaken and the patient elected to proceed.     FINDINGS:  The operative knee showed a severe full-thickness cartilage loss on the femur and tibia in the lateral compartment of the knee.  The patellofemoral joint also showed advanced arthritic changes.      IMPLANTS  1. Angella, Persona, Cr femoral component, size 5 .  2. Angella, Persona, tibial component, size D.  3. Angella, Persona,  all polyethylene articular surface 10 mm thickness.  UC  4. Angella, Persona, all polyethylene patellar button, 32mm diameter      PROCEDURE  Once consent was obtained and the operative site marked in the preop holding area, the patient was brought to the operating room.  Anesthesia was established without difficulty. All bony prominences and the non-operative leg were padded appropriately. The right leg was sterilely prepped and draped in the usual fashion after placement  of a proximal tourniquet.       The limb was exsanguinated, the tourniquet inflated and a longitudinal incision made over the knee.  Dissection was carried down through the extensor mechanism.  A medial parapatellar arthrotomy  was performed.  The patella was luxed laterally and protected. Standard medial release performed.    An intramedullary guide was used in the femur.  The distal femoral was made at 4 degrees of valgus.  The femoral cutting block  was applied and the rest of the femoral cuts completed. ACL was removed and the PCL was recessed significantly.    Attention was then turned to the tibia.  This was cut using an extramedullary tibial cutting guide perpendicular to its mechanical axis.  The trial tibial and femoral components were then placed and the knee reduced. The patella was resurfaced and found to track well. Flexion and extension gaps were appropriate and varus valgus stability was good.     The knee was copiously irrigated and all bony surfaces dried.  Cement was mixed and all components were cemented and held until firm.  The knee was again trialed.  The  Polyethylene was opened and snapped into place, the locking mechanism was ensured.  The knee was copiously irrigated. The extensor mechanism was closed with # 1 interrupted Vicryl suture. Deep dermis was closed layer-wise of # 2-0 interrupted inverted Vicryl sutures followed by running # 3-0 Monocryl in the skin.  Dressings were applied. The patient tolerated the procedure well and was returned to the postop recovery area in stable condition.          NATHALIE LORENZ MD

## 2021-09-10 NOTE — PHARMACY-ADMISSION MEDICATION HISTORY
Pharmacy Note - Admission Medication History    Pertinent Provider Information: n/a   ______________________________________________________________________    Prior To Admission (PTA) med list completed and updated in EMR.       PTA Med List   Medication Sig Last Dose     acetaminophen (TYLENOL) 500 MG tablet Take 1,000 mg by mouth every 8 hours as needed for mild pain 9/9/2021 at Unknown time     fexofenadine (ALLEGRA) 180 MG tablet Take 180 mg by mouth daily 9/9/2021 at Unknown time     ibuprofen (ADVIL/MOTRIN) 200 MG tablet Take 200 mg by mouth every 4 hours as needed for mild pain Stopped 9/1/21 8/31/2021     VITAMIN D, CHOLECALCIFEROL, PO Take 2,000 Units by mouth daily 9/3/2021       [facility]    Method of interview communication: in-person    Patient was asked about OTC/herbal products specifically.  PTA med list reflects this.    Based on the pharmacist's assessment, the PTA med list information appears reliable    Allergies were reviewed, assessed, and updated with the patient.      Patient does not use any multi-dose medications prior to admission.     Thank you for the opportunity to participate in the care of this patient.      Amanda Mata RPH     9/10/2021     6:21 AM

## 2021-09-10 NOTE — DISCHARGE INSTRUCTIONS
Discharge Instructions: After Your Surgery  You ve just had surgery. During surgery, you were given medicine called anesthesia to keep you relaxed and free of pain. After surgery, you may have some pain or nausea. This is common. Here are some tips for feeling better and getting well after surgery.     Stay on schedule with your medicine.   Going home  Your healthcare provider will show you how to take care of yourself when you go home. He or she will also answer your questions. Have an adult family member or friend drive you home. For the first 24 hours after your surgery:    Don't drive or use heavy equipment.    Don't make important decisions or sign legal papers.    Don't drink alcohol.    Have someone stay with you. He or she can watch for problems and help keep you safe.  Be sure to go to all follow-up visits with your healthcare provider. And rest after your surgery for as long as your healthcare provider tells you to.  Coping with pain  If you have pain after surgery, pain medicine will help you feel better. Take it as told, before pain becomes severe. Also, ask your healthcare provider or pharmacist about other ways to control pain. This might be with heat, ice, or relaxation. And follow any other instructions your surgeon or nurse gives you.  Tips for taking pain medicine  To get the best relief possible, remember these points:    Pain medicines can upset your stomach. Taking them with a little food may help.    Most pain relievers taken by mouth need at least 20 to 30 minutes to start to work.    Don't wait till your pain becomes severe before you take your medicine. Try to time your medicine so that you can take it before starting an activity. This might be before you get dressed, go for a walk, or sit down for dinner.    Constipation is a common side effect of pain medicines.  Drinking lots of fluids and eating foods such as fruits and vegetables that are high in fiber can also help.    Drinking  alcohol and taking pain medicine can cause dizziness and slow your breathing. It can even be deadly. Don't drink alcohol while taking pain medicine.    Pain medicine can make you react more slowly to things. Don't drive or run machinery while taking pain medicine.  Your healthcare provider may tell you to take acetaminophen to help ease your pain. Ask him or her how much you are supposed to take each day. Acetaminophen or other pain relievers may interact with your prescription medicines or other over-the-counter (OTC) medicines. Some prescription medicines have acetaminophen and other ingredients. Using both prescription and OTC acetaminophen for pain can cause you to overdose. Read the labels on your OTC medicines with care. This will help you to clearly know the list of ingredients, how much to take, and any warnings. It may also help you not take too much acetaminophen. If you have questions or don't understand the information, ask your pharmacist or healthcare provider to explain it to you before you take the OTC medicine.  Managing nausea  Some people have an upset stomach after surgery. This is often because of anesthesia, pain, or pain medicine, or the stress of surgery. These tips will help you handle nausea and eat healthy foods as you get better. If you were on a special food plan before surgery, ask your healthcare provider if you should follow it while you get better. These tips may help:    Don't push yourself to eat. Your body will tell you when to eat and how much.    Start off with clear liquids and soup. They are easier to digest.    Next try semi-solid foods, such as mashed potatoes, applesauce, and gelatin, as you feel ready.    Slowly move to solid foods. Don t eat fatty, rich, or spicy foods at first.    Don't force yourself to have 3 large meals a day. Instead eat smaller amounts more often.    Take pain medicines with a small amount of solid food, such as crackers or toast, to prevent  nausea.  When to call your healthcare provider  Call your healthcare provider if:    You still have intolerable pain an hour after taking medicine. The medicine may not be strong enough.    You feel too sleepy, dizzy, or groggy. The medicine may be too strong.    You have side effects such as nausea or vomiting, or skin changes such as rash, itching, or hives. Your healthcare provider may suggest other medicines to control side effects.  Rash, itching, or hives may mean you have an allergic reaction. Report this right away. If you have trouble breathing or facial swelling, call 911 right away.  If you have obstructive sleep apnea  You were given anesthesia medicine during surgery to keep you comfortable and free of pain. After surgery, you may have more apnea spells because of this medicine and other medicines you were given. The spells may last longer than usual.   At home:    Keep using the continuous positive airway pressure (CPAP) device when you sleep. Unless your healthcare provider tells you not to, use it when you sleep, day or night. CPAP is a common device used to treat obstructive sleep apnea.    Talk with your provider before taking any pain medicine, muscle relaxants, or sedatives. Your provider will tell you about the possible dangers of taking these medicines.  [employer] ([employer]) last reviewed this educational content on 3/1/2019    1549-9727 [employer]. All rights reserved. This information is not intended as a substitute for professional medical care. Always follow your healthcare professional's instructions.

## 2021-09-10 NOTE — ANESTHESIA PROCEDURE NOTES
Intrathecal injection Procedure Note  Pre-Procedure   Staff -        Anesthesiologist:  Wilma Clark MD       Performed By: anesthesiologist       Location: OR       Pre-Anesthestic Checklist: patient identified, IV checked, risks and benefits discussed, informed consent, monitors and equipment checked, pre-op evaluation, at physician/surgeon's request and post-op pain management  Timeout:       Correct Patient: Yes        Correct Procedure: Yes        Correct Site: Yes        Correct Position: Yes   Procedure Documentation  Procedure: intrathecal injection       Patient Position: sitting       Patient Prep/Sterile Barriers: sterile gloves, mask, patient draped       Skin prep: Chloraprep       Insertion Site: L2-3. (midline approach).       Needle Gauge: 24.        Needle Length (Inches): 4        Spinal Needle Type: Pencan       Introducer used      # of attempts: 3 and  # of redirects:     Assessment/Narrative         Paresthesias: No.       CSF fluid: clear.      Opening pressure was cmH2O while  Sitting.

## 2021-09-11 NOTE — PROGRESS NOTES
Surgeon: [facility]   Hospital: [facility]  Name of Surgery: Total Knee Arthroplasty right  Date of Surgery: 9/10/2021    Hi,  I am a registerednurse calling from [facility] to check in and see how you are doing following your joint replacement surgery today.    1) Is your pain level manageable?  Yes!   If not, have you been taking your pain medications asprescribed and have you tried icing and elevating your surgical extremity?   2) Are you having any new or increased numbness or tingling in your surgical leg? No  3) Are you having increased swelling around yoursurgical site? no.   4) Are you having any drainage from your incision? no  If so, is the drainage saturating or coming through your dressing?  5) Have you been able to walk short distances around your home witha walker? Yes.  I've been up walking 3-4 times since I left the hospital  6) Have you been able to urinate since surgery? Yes  7) If patient is a uni-compartmental-knee or total knee replacement, do they have outpatient physical therapy set up? NA . If not, direct them tocontact their surgeon's office to ask if they recommend outpatient physical therapy. Please note: most total hip replacement patients do not need outpatient physical therapy unless their surgeon specifically orders it.    7) Do you have any other questions or concerns at this time? When did I last take my oxycodone at the hospital.  RN reviewed time.  Also discussed use of orth[facility] or call to [facility] if problems  If patient has significant concerns after hours make sure they have appropriate after hours call number for their surgeon.

## 2021-09-28 ENCOUNTER — TRANSFERRED RECORDS (OUTPATIENT)
Dept: HEALTH INFORMATION MANAGEMENT | Facility: CLINIC | Age: 71
End: 2021-09-28

## 2021-09-29 ENCOUNTER — NURSE TRIAGE (OUTPATIENT)
Dept: FAMILY MEDICINE | Facility: CLINIC | Age: 71
End: 2021-09-29

## 2021-09-29 ENCOUNTER — VIRTUAL VISIT (OUTPATIENT)
Dept: FAMILY MEDICINE | Facility: CLINIC | Age: 71
End: 2021-09-29
Payer: COMMERCIAL

## 2021-09-29 DIAGNOSIS — N30.00 ACUTE CYSTITIS WITHOUT HEMATURIA: Primary | ICD-10-CM

## 2021-09-29 PROCEDURE — 99213 OFFICE O/P EST LOW 20 MIN: CPT | Mod: 95 | Performed by: NURSE PRACTITIONER

## 2021-09-29 RX ORDER — SULFAMETHOXAZOLE/TRIMETHOPRIM 800-160 MG
1 TABLET ORAL 2 TIMES DAILY
Qty: 6 TABLET | Refills: 0 | Status: SHIPPED | OUTPATIENT
Start: 2021-09-29 | End: 2021-10-02

## 2021-09-29 NOTE — PROGRESS NOTES
Michaela is a 71 year old who is being evaluated via a billable video visit.      How would you like to obtain your AVS? MyChart  If the video visit is dropped, the invitation should be resent by: Text to cell phone: [phone]  Will anyone else be joining your video visit? No    Video Start Time: 12:40 PM    Assessment & Plan   Problem List Items Addressed This Visit     None      Visit Diagnoses     Acute cystitis without hematuria    -  Primary    Relevant Medications    sulfamethoxazole-trimethoprim (BACTRIM DS) 800-160 MG tablet         UTI symptoms. She just had TKA so has difficulty leaving the house. We will tx at this time and if no improvement then we will need a UA       JAMES Barajas CNP  [facility]    Subjective   Michaela is a 71 year old who presents for the following health issues     HPI     Just started having urinary symptoms a couple days ago  Dysuria, frequency, LBP and achy. Urine odor   No hematuria, fever         Review of Systems   Detailed as above       Objective           Vitals:  No vitals were obtained today due to virtual visit.    Physical Exam   GENERAL: Healthy, alert and no distress  EYES: Eyes grossly normal to inspection.  No discharge or erythema, or obvious scleral/conjunctival abnormalities.  RESP: No audible wheeze, cough, or visible cyanosis.  No visible retractions or increased work of breathing.    SKIN: Visible skin clear. No significant rash, abnormal pigmentation or lesions.  NEURO: Cranial nerves grossly intact.  Mentation and speech appropriate for age.  PSYCH: Mentation appears normal, affect normal/bright, judgement and insight intact, normal speech and appearance well-groomed.            Video-Visit Details    Type of service:  Video Visit    Video End Time:12:44 PM    Originating Location (pt. Location): Home    Distant Location (provider location):  [facility]     Platform used for Video Visit: [employer]

## 2021-09-29 NOTE — TELEPHONE ENCOUNTER
Patient called clinic.      Complaint of UTI sx.  SYMPTOMS: urgency, burning, suprapubic pain and pressure, back pain and   fishy smell  Pelvic pressure  gen aches  Not empty bladder completely    Denies fever  Denies hematuria    ONSET:    2 day(s) ago .  Patient's history of UTI:  Reports UTI after previous TKA x 1 yr ago.     Fever: no  (>101 F or chills-schedule with provider)  Chills:  no  Hematuria: no  Back/Flank Pain:  yes  Pregnant:  no  Recurrent UTI's (within 2 mths-or >4 per year):  No    Plan:  Video Visit scheduled with TEAM today  Pharmacy pended.       Advise per RN protocols.   See protocol, assessment, disposition, and care advise.     Caller agrees with this plan/advise.     [id number] - [place], [place] - [address]    [phone]  May leave detailed msg/orders on voice mail.         Reason for Disposition    Bad or foul-smelling urine    Urinating more frequently than usual (i.e., frequency)    Additional Information    Negative: Shock suspected (e.g., cold/pale/clammy skin, too weak to stand, low BP, rapid pulse)    Negative: Sounds like a life-threatening emergency to the triager    Negative: Followed a genital area injury    Negative: Followed a genital area injury (penis, scrotum)    Negative: Vaginal discharge    Negative: Pus (white, yellow) or bloody discharge from end of penis    Negative: Discomfort (pain, burning or stinging) when passing urine and pregnant    Negative: Discomfort (pain, burning or stinging) when passing urine and female    Negative: Discomfort (pain, burning or stinging) when passing urine and male    Negative: Pain or itching in the vulvar area    Negative: Pain in scrotum is main symptom    Negative: Blood in the urine is main symptom    Negative: Symptoms arising from use of a urinary catheter (Stokes or Coude)    Negative: Unable to urinate (or only a few drops) > 4 hours and bladder feels very full (e.g., palpable bladder or strong urge to  urinate)    Negative: Decreased urination and drinking very little and dehydration suspected (e.g., dark urine, no urine > 12 hours, very dry mouth, very lightheaded)    Negative: Patient sounds very sick or weak to the triager    Negative: Fever > 100.4 F (38.0 C)    Protocols used: URINARY SYMPTOMS-A-[place]    Judy GENAO, RN      September 29, 2021  8:17 AM

## 2021-10-26 ENCOUNTER — TRANSFERRED RECORDS (OUTPATIENT)
Dept: HEALTH INFORMATION MANAGEMENT | Facility: CLINIC | Age: 71
End: 2021-10-26
Payer: COMMERCIAL

## 2022-02-19 ENCOUNTER — HEALTH MAINTENANCE LETTER (OUTPATIENT)
Age: 72
End: 2022-02-19

## 2022-06-15 ENCOUNTER — OFFICE VISIT (OUTPATIENT)
Dept: FAMILY MEDICINE | Facility: CLINIC | Age: 72
End: 2022-06-15
Payer: COMMERCIAL

## 2022-06-15 VITALS
SYSTOLIC BLOOD PRESSURE: 128 MMHG | WEIGHT: 236 LBS | RESPIRATION RATE: 17 BRPM | OXYGEN SATURATION: 97 % | TEMPERATURE: 98 F | HEART RATE: 75 BPM | HEIGHT: 62 IN | BODY MASS INDEX: 43.43 KG/M2 | DIASTOLIC BLOOD PRESSURE: 80 MMHG

## 2022-06-15 DIAGNOSIS — Z01.818 PRE-OPERATIVE GENERAL PHYSICAL EXAMINATION: Primary | ICD-10-CM

## 2022-06-15 DIAGNOSIS — R05.9 COUGH: ICD-10-CM

## 2022-06-15 DIAGNOSIS — H26.9 CATARACT, UNSPECIFIED CATARACT TYPE, UNSPECIFIED LATERALITY: ICD-10-CM

## 2022-06-15 DIAGNOSIS — R06.2 WHEEZE: ICD-10-CM

## 2022-06-15 LAB
ERYTHROCYTE [DISTWIDTH] IN BLOOD BY AUTOMATED COUNT: 13.3 % (ref 10–15)
HCT VFR BLD AUTO: 48 % (ref 35–47)
HGB BLD-MCNC: 15.5 G/DL (ref 11.7–15.7)
MCH RBC QN AUTO: 30.5 PG (ref 26.5–33)
MCHC RBC AUTO-ENTMCNC: 32.3 G/DL (ref 31.5–36.5)
MCV RBC AUTO: 94 FL (ref 78–100)
PLATELET # BLD AUTO: 215 10E3/UL (ref 150–450)
RBC # BLD AUTO: 5.09 10E6/UL (ref 3.8–5.2)
SARS-COV-2 RNA RESP QL NAA+PROBE: NEGATIVE
WBC # BLD AUTO: 9.4 10E3/UL (ref 4–11)

## 2022-06-15 PROCEDURE — 80053 COMPREHEN METABOLIC PANEL: CPT | Performed by: INTERNAL MEDICINE

## 2022-06-15 PROCEDURE — U0005 INFEC AGEN DETEC AMPLI PROBE: HCPCS | Performed by: INTERNAL MEDICINE

## 2022-06-15 PROCEDURE — U0003 INFECTIOUS AGENT DETECTION BY NUCLEIC ACID (DNA OR RNA); SEVERE ACUTE RESPIRATORY SYNDROME CORONAVIRUS 2 (SARS-COV-2) (CORONAVIRUS DISEASE [COVID-19]), AMPLIFIED PROBE TECHNIQUE, MAKING USE OF HIGH THROUGHPUT TECHNOLOGIES AS DESCRIBED BY CMS-2020-01-R: HCPCS | Performed by: INTERNAL MEDICINE

## 2022-06-15 PROCEDURE — 85027 COMPLETE CBC AUTOMATED: CPT | Performed by: INTERNAL MEDICINE

## 2022-06-15 PROCEDURE — 99215 OFFICE O/P EST HI 40 MIN: CPT | Mod: CS | Performed by: INTERNAL MEDICINE

## 2022-06-15 PROCEDURE — 36415 COLL VENOUS BLD VENIPUNCTURE: CPT | Performed by: INTERNAL MEDICINE

## 2022-06-15 RX ORDER — AZITHROMYCIN 250 MG/1
TABLET, FILM COATED ORAL
Qty: 6 TABLET | Refills: 0 | Status: SHIPPED | OUTPATIENT
Start: 2022-06-15 | End: 2022-06-20

## 2022-06-15 RX ORDER — ALBUTEROL SULFATE 90 UG/1
2 AEROSOL, METERED RESPIRATORY (INHALATION) EVERY 6 HOURS
Qty: 18 G | Refills: 0 | Status: SHIPPED | OUTPATIENT
Start: 2022-06-15 | End: 2022-07-11

## 2022-06-15 ASSESSMENT — PAIN SCALES - GENERAL: PAINLEVEL: NO PAIN (0)

## 2022-06-15 NOTE — PROGRESS NOTES
[address], [address]  [facility] [address]  Phone: [phone]  Primary Provider: Estuardo Last  Pre-op Performing Provider: CHARLA RAMIREZ    Mild abdominal surgery  PREOPERATIVE EVALUATION:  Today's date: 6/15/2022    Tika Swartz is a 72 year old female who presents for a preoperative evaluation.    Surgical Information:  Surgery/Procedure: Cataract  6-22-2-22 & 6-  Surgery Location: [facility]  Surgeon: Dr. Hay  Surgery Date: June 22 then 29th  Time of Surgery:   Where patient plans to recover: At home with family  Fax number for surgical facility: [phone]      Type of Anesthesia Anticipated: to be determined    Assessment & Plan     The proposed surgical procedure is considered LOW risk.    Problem List Items Addressed This Visit    None     Visit Diagnoses     Pre-operative general physical examination    -  Primary    Relevant Orders    REVIEW OF HEALTH MAINTENANCE PROTOCOL ORDERS (Completed)    Comprehensive metabolic panel (BMP + Alb, Alk Phos, ALT, AST, Total. Bili, TP) (Completed)    CBC with platelets (Completed)    Symptomatic; Unknown COVID-19 Virus (Coronavirus) by PCR Nose (Completed)    Cough        Relevant Medications    albuterol (PROAIR HFA/PROVENTIL HFA/VENTOLIN HFA) 108 (90 Base) MCG/ACT inhaler    azithromycin (ZITHROMAX) 250 MG tablet    Other Relevant Orders    Symptomatic; Unknown COVID-19 Virus (Coronavirus) by PCR Nose (Completed)    Wheeze        Relevant Medications    albuterol (PROAIR HFA/PROVENTIL HFA/VENTOLIN HFA) 108 (90 Base) MCG/ACT inhaler    Cataract, unspecified cataract type, unspecified laterality                   Risks and Recommendations:  The patient has the following additional risks and recommendations for perioperative complications:   - No identified additional risk factors other than previously addressed    Medication Instructions:  Patient is to take all scheduled medications on the day of  surgery    RECOMMENDATION:  APPROVAL GIVEN to proceed with proposed procedure pending review of diagnostic evaluation.  Lungs are clear to auscultation advised to use an inhaler as needed for shortness of breath or wheeze or cough, her pulse ox is maintained at 97% room air not in any distress.  Continue to use her antihistamine.  Avoid Sudafed use.  Keep well-hydrated.  Addendum COVID PCR is negative chemistry shows normal electrolytes, normal kidney function test, CBC shows normal white blood cell count hemoglobin hematocrit and normal platelet count.  Patient will be cleared for surgery      Total time spent was 40 minutes review of records, exam recommendation and preop clearance and addressing theCough and wheeze in addition to preop clearance      Subjective     HPI related to upcoming procedure:     Preop Questions 6/15/2022   1. Have you ever had a heart attack or stroke? No   2. Have you ever had surgery on your heart or blood vessels, such as a stent placement, a coronary artery bypass, or surgery on an artery in your head, neck, heart, or legs? No   3. Do you have chest pain with activity? No   4. Do you have a history of  heart failure? No   5. Do you currently have a cold, bronchitis or symptoms of other infection? YES -    6. Do you have a cough, shortness of breath, or wheezing? YES -    7. Do you or anyone in your family have previous history of blood clots? No   8. Do you or does anyone in your family have a serious bleeding problem such as prolonged bleeding following surgeries or cuts? No   9. Have you ever had problems with anemia or been told to take iron pills? No   10. Have you had any abnormal blood loss such as black, tarry or bloody stools, or abnormal vaginal bleeding? No   11. Have you ever had a blood transfusion? No   12. Are you willing to have a blood transfusion if it is medically needed before, during, or after your surgery? Yes   13. Have you or any of your relatives ever had  problems with anesthesia? No   14. Do you have sleep apnea, excessive snoring or daytime drowsiness? No   15. Do you have any artifical heart valves or other implanted medical devices like a pacemaker, defibrillator, or continuous glucose monitor? No   16. Do you have artificial joints? YES -    17. Are you allergic to latex? No       Health Care Directive:  Patient does not have a Health Care Directive or Living Will: Discussed advance care planning with patient; information given to patient to review.    Preoperative Review of :   reviewed - no record of controlled substances prescribed.      Status of Chronic Conditions:  History of allergy maintained on chronic antihistamine    Review of Systems  Constitutional, neuro, ENT, endocrine, pulmonary, cardiac, gastrointestinal, genitourinary, musculoskeletal, integument and psychiatric systems are negative, except as otherwise noted.    Patient Active Problem List    Diagnosis Date Noted     Morbid obesity (H) 07/10/2020     Priority: Medium     Osteoporosis      Priority: Medium     -1.9 left hip, -0.8 spine, dx based on low impact fx       Malignant neoplasm of female breast (H) 09/29/2016     Priority: Medium     Non morbid obesity, unspecified obesity type 09/29/2016     Priority: Medium     Adenomatous colon polyp      Priority: Medium     fu rec 2011       Fibromyalgia 02/19/2013     Priority: Medium      Past Medical History:   Diagnosis Date     Adenomatous colon polyp 2006    fu rec 2011, fu done 2016 and polyps, to fu 2019     Arthritis      Breast cancer (H) 2006    bilat mastectomy, no chemo or xrt, Dr. Garcia, not seeing onc as of 2015     Fibromyalgia      Gastroesophageal reflux disease      Lymphedema      Motion sickness      Obese      Osteoporosis 2016    -1.9 left hip, -0.8 spine, dx based on low impact fx, tried fosamax but gi upset, fu dexa 2018 stable     PONV (postoperative nausea and vomiting)      Past Surgical History:   Procedure  Laterality Date     ARTHROPLASTY KNEE Right 9/10/2021    Procedure: TOTAL KNEE ARTHROPLASTY right;  Surgeon: Delta Treviño MD;  Location: [facility] Main OR     BIOPSY OF BREAST, NEEDLE CORE  11/22/2005    right breast     BIOPSY OF BREAST, NEEDLE CORE  12/05/2005    left and right breast     fractured wrist Left 2015    low impact     HYSTERECTOMY, PAP STILL INDICATED  2005     left total knee  Left 07/24/2020     MASTECTOMY       MASTECTOMY SIMPLE BILATERAL  2005     ORTHOPEDIC SURGERY       WRIST SURGERY Left      ZZC VAGINAL HYSTERECTOMY  2005    prolapsed uterus     Current Outpatient Medications   Medication Sig Dispense Refill     acetaminophen (TYLENOL) 500 MG tablet Take 1,000 mg by mouth every 8 hours as needed for mild pain       albuterol (PROAIR HFA/PROVENTIL HFA/VENTOLIN HFA) 108 (90 Base) MCG/ACT inhaler Inhale 2 puffs into the lungs every 6 hours 18 g 0     azithromycin (ZITHROMAX) 250 MG tablet Take 2 tablets (500 mg) by mouth daily for 1 day, THEN 1 tablet (250 mg) daily for 4 days. 6 tablet 0     fexofenadine (ALLEGRA) 180 MG tablet Take 180 mg by mouth daily       VITAMIN D, CHOLECALCIFEROL, PO Take 2,000 Units by mouth daily         Allergies   Allergen Reactions     Macrobid [Nitrofurantoin] Hives     Seasonal Allergies      Eye watering, nasal symptoms        Social History     Tobacco Use     Smoking status: Never Smoker     Smokeless tobacco: Never Used   Substance Use Topics     Alcohol use: Yes     Alcohol/week: 0.0 standard drinks     Comment: occasionally     Family History   Problem Relation Age of Onset     Neurologic Disorder Mother         puja gehrig's disease age 76     Blood Disease Father         blood clots in lungs and legs     Colon Cancer Father      Unknown/Adopted Maternal Grandmother      Cancer Maternal Grandfather         brain tumor     Cerebrovascular Disease Paternal Grandmother      Cancer Paternal Grandfather         brain tumor     Diabetes Brother      History  "  Drug Use No         Objective     /80 (BP Location: Left arm, Patient Position: Chair, Cuff Size: Adult Large)   Pulse 75   Temp 98  F (36.7  C) (Temporal)   Resp 17   Ht 1.575 m (5' 2\")   Wt 107 kg (236 lb)   LMP 04/13/2001   SpO2 97%   BMI 43.16 kg/m      Physical Exam    GENERAL APPEARANCE: healthy, alert and no distress     EYES: EOMI, PERRL     HENT: ear canals and TM's normal and nose and mouth without ulcers or lesions     NECK: no adenopathy, no asymmetry, masses, or scars and thyroid normal to palpation     RESP: lungs clear to auscultation - no rales, rhonchi or wheezes     CV: regular rates and rhythm, normal S1 S2, no S3 or S4 and no murmur, click or rub     ABDOMEN:  soft, nontender, no HSM or masses and bowel sounds normal     MS: extremities normal- no gross deformities noted, no evidence of inflammation in joints, FROM in all extremities.     SKIN: no suspicious lesions or rashes     NEURO: Normal strength and tone, sensory exam grossly normal, mentation intact and speech normal     PSYCH: mentation appears normal. and affect normal/bright     LYMPHATICS: No cervical adenopathy    Recent Labs   Lab Test 08/31/21  1046 07/24/20  0535 07/10/20  0942   HGB 16.0* 13.1 15.1     --  165     --  141   POTASSIUM 4.2  --  3.8   CR 0.98  --  0.85        Diagnostics:  Labs pending at this time.  Results will be reviewed when available.   No EKG required for low risk surgery (cataract, skin procedure, breast biopsy, etc).    Revised Cardiac Risk Index (RCRI):  The patient has the following serious cardiovascular risks for perioperative complications:   - No serious cardiac risks = 0 points     RCRI Interpretation: 0 points: Class I (very low risk - 0.4% complication rate)           Signed Electronically by: Abdias Rivera MD  Copy of this evaluation report is provided to requesting physician.      "

## 2022-06-16 LAB
ALBUMIN SERPL-MCNC: 3.7 G/DL (ref 3.4–5)
ALP SERPL-CCNC: 101 U/L (ref 40–150)
ALT SERPL W P-5'-P-CCNC: 16 U/L (ref 0–50)
ANION GAP SERPL CALCULATED.3IONS-SCNC: 5 MMOL/L (ref 3–14)
AST SERPL W P-5'-P-CCNC: 17 U/L (ref 0–45)
BILIRUB SERPL-MCNC: 0.6 MG/DL (ref 0.2–1.3)
BUN SERPL-MCNC: 21 MG/DL (ref 7–30)
CALCIUM SERPL-MCNC: 9 MG/DL (ref 8.5–10.1)
CHLORIDE BLD-SCNC: 108 MMOL/L (ref 94–109)
CO2 SERPL-SCNC: 27 MMOL/L (ref 20–32)
CREAT SERPL-MCNC: 0.83 MG/DL (ref 0.52–1.04)
GFR SERPL CREATININE-BSD FRML MDRD: 74 ML/MIN/1.73M2
GLUCOSE BLD-MCNC: 109 MG/DL (ref 70–99)
POTASSIUM BLD-SCNC: 3.7 MMOL/L (ref 3.4–5.3)
PROT SERPL-MCNC: 7.6 G/DL (ref 6.8–8.8)
SODIUM SERPL-SCNC: 140 MMOL/L (ref 133–144)

## 2022-06-16 NOTE — RESULT ENCOUNTER NOTE
Michaela, reviewed your labs, CBC shows normal white blood cell count, normal hemoglobin hematocrit there is no anemia and normal platelet count  Dr. Rivera

## 2022-07-07 DIAGNOSIS — R05.9 COUGH: ICD-10-CM

## 2022-07-07 DIAGNOSIS — R06.2 WHEEZE: ICD-10-CM

## 2022-07-11 RX ORDER — ALBUTEROL SULFATE 90 UG/1
2 AEROSOL, METERED RESPIRATORY (INHALATION) EVERY 6 HOURS
Qty: 18 G | Refills: 0 | Status: SHIPPED | OUTPATIENT
Start: 2022-07-11 | End: 2023-02-09

## 2022-10-01 ENCOUNTER — IMMUNIZATION (OUTPATIENT)
Dept: FAMILY MEDICINE | Facility: CLINIC | Age: 72
End: 2022-10-01
Payer: COMMERCIAL

## 2022-10-01 DIAGNOSIS — Z23 NEED FOR PROPHYLACTIC VACCINATION AND INOCULATION AGAINST INFLUENZA: ICD-10-CM

## 2022-10-01 DIAGNOSIS — Z23 HIGH PRIORITY FOR 2019-NCOV VACCINE: ICD-10-CM

## 2022-10-01 PROCEDURE — 90662 IIV NO PRSV INCREASED AG IM: CPT

## 2022-10-01 PROCEDURE — G0008 ADMIN INFLUENZA VIRUS VAC: HCPCS | Mod: 59

## 2022-10-01 PROCEDURE — 91312 COVID-19,PF,PFIZER BOOSTER BIVALENT: CPT

## 2022-10-01 PROCEDURE — 0124A COVID-19,PF,PFIZER BOOSTER BIVALENT: CPT

## 2022-11-21 ENCOUNTER — TRANSFERRED RECORDS (OUTPATIENT)
Dept: HEALTH INFORMATION MANAGEMENT | Facility: CLINIC | Age: 72
End: 2022-11-21

## 2023-01-26 ENCOUNTER — OFFICE VISIT (OUTPATIENT)
Dept: FAMILY MEDICINE | Facility: CLINIC | Age: 73
End: 2023-01-26
Payer: COMMERCIAL

## 2023-01-26 VITALS
HEART RATE: 69 BPM | OXYGEN SATURATION: 97 % | SYSTOLIC BLOOD PRESSURE: 146 MMHG | RESPIRATION RATE: 16 BRPM | BODY MASS INDEX: 43.98 KG/M2 | DIASTOLIC BLOOD PRESSURE: 81 MMHG | WEIGHT: 239 LBS | TEMPERATURE: 97.7 F | HEIGHT: 62 IN

## 2023-01-26 DIAGNOSIS — B02.9 HERPES ZOSTER WITHOUT COMPLICATION: Primary | ICD-10-CM

## 2023-01-26 PROCEDURE — 99213 OFFICE O/P EST LOW 20 MIN: CPT | Performed by: INTERNAL MEDICINE

## 2023-01-26 RX ORDER — VALACYCLOVIR HYDROCHLORIDE 1 G/1
1000 TABLET, FILM COATED ORAL 3 TIMES DAILY
Qty: 21 TABLET | Refills: 0 | Status: SHIPPED | OUTPATIENT
Start: 2023-01-26 | End: 2023-02-09

## 2023-01-26 ASSESSMENT — PAIN SCALES - GENERAL: PAINLEVEL: NO PAIN (0)

## 2023-01-26 NOTE — PROGRESS NOTES
This is a pleasant 72-year-old with a rash.  This started on Monday or Tuesday.  It somewhat uncomfortable.  It also causes some back pain on the left side.  The pain does not radiate.  She is not having nausea.  No fevers.  She does not have a history of shingles.  She has not had Shingrix.    Past Medical History:   Diagnosis Date     Adenomatous colon polyp 2006    fu rec 2011, fu done 2016 and polyps, to fu 2019     Arthritis      Breast cancer (H) 2006    bilat mastectomy, no chemo or xrt, Dr. Garcia, not seeing onc as of 2015     Fibromyalgia      Gastroesophageal reflux disease      Lymphedema      Motion sickness      Obese      Osteoporosis 2016    -1.9 left hip, -0.8 spine, dx based on low impact fx, tried fosamax but gi upset, fu dexa 2018 stable     PONV (postoperative nausea and vomiting)      Shingles 01/2023     Past Surgical History:   Procedure Laterality Date     ARTHROPLASTY KNEE Right 9/10/2021    Procedure: TOTAL KNEE ARTHROPLASTY right;  Surgeon: Delta Treviño MD;  Location: [facility] OR     BIOPSY OF BREAST, NEEDLE CORE  11/22/2005    right breast     BIOPSY OF BREAST, NEEDLE CORE  12/05/2005    left and right breast     fractured wrist Left 2015    low impact     HYSTERECTOMY, PAP STILL INDICATED  2005     left total knee  Left 07/24/2020     MASTECTOMY       MASTECTOMY SIMPLE BILATERAL  2005     ORTHOPEDIC SURGERY       WRIST SURGERY Left      ZZC VAGINAL HYSTERECTOMY  2005    prolapsed uterus     Social History     Socioeconomic History     Marital status:      Spouse name: Not on file     Number of children: 2     Years of education: Not on file     Highest education level: Not on file   Occupational History     Occupation: reitred   Tobacco Use     Smoking status: Never     Smokeless tobacco: Never   Substance and Sexual Activity     Alcohol use: Yes     Alcohol/week: 0.0 standard drinks     Comment: occasionally     Drug use: No     Sexual activity: Not Currently  "  Other Topics Concern     Parent/sibling w/ CABG, MI or angioplasty before 65F 55M? No   Social History Narrative     Not on file     Social Determinants of Health     Financial Resource Strain: Not on file   Food Insecurity: Not on file   Transportation Needs: Not on file   Physical Activity: Not on file   Stress: Not on file   Social Connections: Not on file   Intimate Partner Violence: Not on file   Housing Stability: Not on file     Current Outpatient Medications   Medication Sig Dispense Refill     acetaminophen (TYLENOL) 500 MG tablet Take 1,000 mg by mouth every 8 hours as needed for mild pain       fexofenadine (ALLEGRA) 180 MG tablet Take 180 mg by mouth daily       valACYclovir (VALTREX) 1000 mg tablet Take 1 tablet (1,000 mg) by mouth 3 times daily for 7 days 21 tablet 0     VITAMIN D, CHOLECALCIFEROL, PO Take 2,000 Units by mouth daily       albuterol (PROAIR HFA/PROVENTIL HFA/VENTOLIN HFA) 108 (90 Base) MCG/ACT inhaler Inhale 2 puffs into the lungs every 6 hours 18 g 0     Allergies   Allergen Reactions     Macrobid [Nitrofurantoin] Hives     Seasonal Allergies      Eye watering, nasal symptoms     FAMILY HISTORY NOTED AND REVIEWED    REVIEW OF SYSTEMS: above    PHYSICAL EXAM    BP (!) 146/81 (BP Location: Right arm, Patient Position: Sitting, Cuff Size: Adult Large)   Pulse 69   Temp 97.7  F (36.5  C) (Temporal)   Resp 16   Ht 1.575 m (5' 2\")   Wt 108.4 kg (239 lb)   LMP 04/13/2001   SpO2 97%   BMI 43.71 kg/m      Patient appears non toxic  She has an obvious shingles-like rash in a band distribution in the left low back radiating around to the front but not crossing the midline.  No other lesions are noted.  Some of it is scabbing over.    ASSESSMENT:  Shingles with low back pain due to this.    PLAN:  Valtrex  otc nsaid or tylenol  Patient is instructed to call if her symptoms worsen, she gets a fever or becomes ill.  She does have follow-up with me in 2 weeks.    Estuardo Last, " M.D.

## 2023-01-26 NOTE — PATIENT INSTRUCTIONS
Start the valtrex for the shingles right away.    Use advil or aleve or tylenol for the pain.      Let me know if the pain worsens or you have new symptoms.    Estuardo Last M.D.

## 2023-02-06 ENCOUNTER — TELEPHONE (OUTPATIENT)
Dept: FAMILY MEDICINE | Facility: CLINIC | Age: 73
End: 2023-02-06
Payer: COMMERCIAL

## 2023-02-06 DIAGNOSIS — B02.9 HERPES ZOSTER WITHOUT COMPLICATION: Primary | ICD-10-CM

## 2023-02-06 RX ORDER — GABAPENTIN 300 MG/1
300 CAPSULE ORAL 2 TIMES DAILY
Qty: 60 CAPSULE | Refills: 1 | Status: SHIPPED | OUTPATIENT
Start: 2023-02-06 | End: 2023-04-14

## 2023-02-06 ASSESSMENT — ENCOUNTER SYMPTOMS
DYSURIA: 0
HEMATOCHEZIA: 0
PARESTHESIAS: 0
BREAST MASS: 0
HEADACHES: 0
NERVOUS/ANXIOUS: 0
MYALGIAS: 1
EYE PAIN: 0
HEARTBURN: 0
WEAKNESS: 0
CHILLS: 0
FEVER: 0
NAUSEA: 0
JOINT SWELLING: 0
ARTHRALGIAS: 0
CONSTIPATION: 0
ABDOMINAL PAIN: 0
FREQUENCY: 0
SORE THROAT: 0
SHORTNESS OF BREATH: 0
PALPITATIONS: 0
COUGH: 0
DIZZINESS: 0
HEMATURIA: 0
DIARRHEA: 0

## 2023-02-06 ASSESSMENT — ACTIVITIES OF DAILY LIVING (ADL): CURRENT_FUNCTION: NO ASSISTANCE NEEDED

## 2023-02-06 NOTE — TELEPHONE ENCOUNTER
Called patient and relayed MD message below. She is in agreement.     Kiersten Ferro RN on 2/6/2023 at 8:03 AM

## 2023-02-06 NOTE — TELEPHONE ENCOUNTER
Patient reports 1010 post shingles pain. No new lesions.  Pain is in lower back. Waking up all night.  Has taken Aleve Q 12 hours and ibuprofen and Tylenol in between, but nothing is helping.    Patient completed Valacyclovir last Wednesday evening. Pain was back Friday evening.  Was advised to call back if pain.     Patient is scheduled for annual check up this Thursday.    Please call patient when ordered.    Can we leave a detailed message on this number? YES  Phone number patient can be reached at: Cell number on file:    Telephone Information:   Mobile [phone]       Mckenzie Hernandez, RN  [facility] Triage

## 2023-02-06 NOTE — TELEPHONE ENCOUNTER
Please let her know if she develops a fever or any new lesions to let me know.    I think we should try gabapentin.  I will send in a prescription for 300 mg twice a day which is a low-dose but should work.  She should take 1 in the morning and 1 about an hour prior to bed.  The biggest side effect can be fatigue and if that is an issue she should let me know.  Please start it as soon as possible.    Thank you    Estuardo Last M.D.

## 2023-02-09 ENCOUNTER — OFFICE VISIT (OUTPATIENT)
Dept: FAMILY MEDICINE | Facility: CLINIC | Age: 73
End: 2023-02-09
Payer: COMMERCIAL

## 2023-02-09 VITALS
BODY MASS INDEX: 43.61 KG/M2 | SYSTOLIC BLOOD PRESSURE: 136 MMHG | HEART RATE: 71 BPM | RESPIRATION RATE: 16 BRPM | TEMPERATURE: 97.7 F | OXYGEN SATURATION: 97 % | WEIGHT: 237 LBS | HEIGHT: 62 IN | DIASTOLIC BLOOD PRESSURE: 82 MMHG

## 2023-02-09 DIAGNOSIS — Z00.00 MEDICARE ANNUAL WELLNESS VISIT, SUBSEQUENT: Primary | ICD-10-CM

## 2023-02-09 DIAGNOSIS — M81.0 OSTEOPOROSIS, UNSPECIFIED OSTEOPOROSIS TYPE, UNSPECIFIED PATHOLOGICAL FRACTURE PRESENCE: ICD-10-CM

## 2023-02-09 DIAGNOSIS — C50.919 MALIGNANT NEOPLASM OF FEMALE BREAST, UNSPECIFIED ESTROGEN RECEPTOR STATUS, UNSPECIFIED LATERALITY, UNSPECIFIED SITE OF BREAST (H): ICD-10-CM

## 2023-02-09 DIAGNOSIS — M79.7 FIBROMYALGIA: ICD-10-CM

## 2023-02-09 DIAGNOSIS — D12.6 ADENOMATOUS POLYP OF COLON, UNSPECIFIED PART OF COLON: ICD-10-CM

## 2023-02-09 DIAGNOSIS — E66.01 MORBID OBESITY (H): ICD-10-CM

## 2023-02-09 LAB
ALBUMIN SERPL BCG-MCNC: 4.1 G/DL (ref 3.5–5.2)
ALP SERPL-CCNC: 88 U/L (ref 35–104)
ALT SERPL W P-5'-P-CCNC: 16 U/L (ref 10–35)
ANION GAP SERPL CALCULATED.3IONS-SCNC: 11 MMOL/L (ref 7–15)
AST SERPL W P-5'-P-CCNC: 20 U/L (ref 10–35)
BILIRUB SERPL-MCNC: 0.7 MG/DL
BUN SERPL-MCNC: 23.3 MG/DL (ref 8–23)
CALCIUM SERPL-MCNC: 9 MG/DL (ref 8.8–10.2)
CHLORIDE SERPL-SCNC: 105 MMOL/L (ref 98–107)
CHOLEST SERPL-MCNC: 189 MG/DL
CREAT SERPL-MCNC: 0.92 MG/DL (ref 0.51–0.95)
DEPRECATED HCO3 PLAS-SCNC: 25 MMOL/L (ref 22–29)
ERYTHROCYTE [DISTWIDTH] IN BLOOD BY AUTOMATED COUNT: 13.9 % (ref 10–15)
GFR SERPL CREATININE-BSD FRML MDRD: 65 ML/MIN/1.73M2
GLUCOSE SERPL-MCNC: 110 MG/DL (ref 70–99)
HCT VFR BLD AUTO: 46.9 % (ref 35–47)
HDLC SERPL-MCNC: 51 MG/DL
HGB BLD-MCNC: 15.4 G/DL (ref 11.7–15.7)
LDLC SERPL CALC-MCNC: 120 MG/DL
MCH RBC QN AUTO: 30.8 PG (ref 26.5–33)
MCHC RBC AUTO-ENTMCNC: 32.8 G/DL (ref 31.5–36.5)
MCV RBC AUTO: 94 FL (ref 78–100)
NONHDLC SERPL-MCNC: 138 MG/DL
PLATELET # BLD AUTO: 230 10E3/UL (ref 150–450)
POTASSIUM SERPL-SCNC: 4.4 MMOL/L (ref 3.4–5.3)
PROT SERPL-MCNC: 6.9 G/DL (ref 6.4–8.3)
RBC # BLD AUTO: 5 10E6/UL (ref 3.8–5.2)
SODIUM SERPL-SCNC: 141 MMOL/L (ref 136–145)
TRIGL SERPL-MCNC: 91 MG/DL
WBC # BLD AUTO: 7.9 10E3/UL (ref 4–11)

## 2023-02-09 PROCEDURE — 85027 COMPLETE CBC AUTOMATED: CPT | Performed by: INTERNAL MEDICINE

## 2023-02-09 PROCEDURE — 80053 COMPREHEN METABOLIC PANEL: CPT | Performed by: INTERNAL MEDICINE

## 2023-02-09 PROCEDURE — 36415 COLL VENOUS BLD VENIPUNCTURE: CPT | Performed by: INTERNAL MEDICINE

## 2023-02-09 PROCEDURE — G0439 PPPS, SUBSEQ VISIT: HCPCS | Performed by: INTERNAL MEDICINE

## 2023-02-09 PROCEDURE — 80061 LIPID PANEL: CPT | Performed by: INTERNAL MEDICINE

## 2023-02-09 ASSESSMENT — PAIN SCALES - GENERAL: PAINLEVEL: NO PAIN (0)

## 2023-02-09 ASSESSMENT — ACTIVITIES OF DAILY LIVING (ADL): CURRENT_FUNCTION: NO ASSISTANCE NEEDED

## 2023-02-09 NOTE — RESULT ENCOUNTER NOTE
It was very nice to see you for your physical.  You should be able to view your test results.    Your CBC or complete blood count is normal with no signs of anemia or blood disorders.  Your chemistry panel shows the slightly elevated blood sugar.  Please be sure to exercise and try to get your weight down for this.  Your blood salts, kidney test, liver tests, and proteins are normal.    Your total cholesterol is very good.  Your HDL or good cholesterol is fine.  Your LDL or bad cholesterol is just slightly high but not a worry.  Again, I would encourage exercise and weight loss.    If you have questions please let me know.    Estuardo Last M.D.

## 2023-02-09 NOTE — PROGRESS NOTES
SUBJECTIVE:   Michaela is a 73 year old who presents for Preventive Visit.    The patient was diagnosed with shingles as noted at her last visit.  Because of pain I added gabapentin.  She is just taking a dose at night and that is helping significantly.    She otherwise feels fine with no complaints.  She is not exercising.  Her weight remains a significant issue.  Her blood pressure is slightly high as noted.               Past Medical History:      Past Medical History:   Diagnosis Date     Adenomatous colon polyp 2006    fu rec 2011, fu done 2016 and polyps, to fu 2019     Arthritis      Breast cancer (H) 2006    bilat mastectomy, no chemo or xrt, Dr. Garcia, not seeing onc as of 2015     Fibromyalgia      Gastroesophageal reflux disease      Lymphedema      Motion sickness      Obese      Osteoporosis 2016    -1.9 left hip, -0.8 spine, dx based on low impact fx, tried fosamax but gi upset, fu dexa 2018 stable     PONV (postoperative nausea and vomiting)      Shingles 01/2023             Past Surgical History:      Past Surgical History:   Procedure Laterality Date     ARTHROPLASTY KNEE Right 09/10/2021    Procedure: TOTAL KNEE ARTHROPLASTY right;  Surgeon: Delta Treviño MD;  Location: [facility] Main OR     BIOPSY OF BREAST, NEEDLE CORE  11/22/2005    right breast     BIOPSY OF BREAST, NEEDLE CORE  12/05/2005    left and right breast     CATARACT EXTRACTION  2022     fractured wrist Left 2015    low impact     HYSTERECTOMY, PAP STILL INDICATED  2005     left total knee  Left 07/24/2020     MASTECTOMY       MASTECTOMY SIMPLE BILATERAL  2005     ORTHOPEDIC SURGERY       WRIST SURGERY Left      ZZC VAGINAL HYSTERECTOMY  2005    prolapsed uterus             Social History:     Social History     Socioeconomic History     Marital status:      Spouse name: Not on file     Number of children: 2     Years of education: Not on file     Highest education level: Not on file   Occupational History     Occupation:  "[employer]   Tobacco Use     Smoking status: Never     Smokeless tobacco: Never   Substance and Sexual Activity     Alcohol use: Yes     Alcohol/week: 0.0 standard drinks     Comment: occasionally     Drug use: No     Sexual activity: Not Currently   Other Topics Concern     Parent/sibling w/ CABG, MI or angioplasty before 65F 55M? No   Social History Narrative     Not on file     Social Determinants of Health     Financial Resource Strain: Not on file   Food Insecurity: Not on file   Transportation Needs: Not on file   Physical Activity: Not on file   Stress: Not on file   Social Connections: Not on file   Intimate Partner Violence: Not on file   Housing Stability: Not on file             Family History:   reviewed         Allergies:     Allergies   Allergen Reactions     Macrobid [Nitrofurantoin] Hives     Seasonal Allergies      Eye watering, nasal symptoms             Medications:     Current Outpatient Medications   Medication Sig Dispense Refill     fexofenadine (ALLEGRA) 180 MG tablet Take 180 mg by mouth daily       gabapentin (NEURONTIN) 300 MG capsule Take 1 capsule (300 mg) by mouth 2 times daily 60 capsule 1     VITAMIN D, CHOLECALCIFEROL, PO Take 2,000 Units by mouth daily                 Review of Systems:   The 10 point Review of Systems is negative other than noted in the HPI           Physical Exam:   Blood pressure 136/82, pulse 71, temperature 97.7  F (36.5  C), temperature source Temporal, resp. rate 16, height 1.575 m (5' 2\"), weight 107.5 kg (237 lb), last menstrual period 04/13/2001, SpO2 97 %, not currently breastfeeding.    Exam:  Constitutional: healthy appearing, alert and in no distress  Heent: Normocephalic. Head without obvious masses or lesions. PERRLDC, EOMI. Mouth exam within normal limits: tongue, mucous membranes, posterior pharynx all normal, no lesions or abnormalities seen.  Tm's and canals within normal limits bilaterally. Neck supple, no nuchal rigidity or masses. No " "supraclavicular, or cervical adenopathy. Thyroid symmetric, no masses.  Cardiovascular: Regular rate and rhythm, no murmer, rub or gallops.  JVP not elevated, no edema.  Carotids within normal limits bilaterally, no bruits.  Respiratory: Normal respiratory effort.  Lungs clear, normal flow, no wheezing or crackles.  Gastrointestinal: Normal active bowel sounds.   Soft, not tender, no masses, guarding or rebound.  No hepatosplenomegaly.   Musculoskeletal: extremities normal, no gross deformities noted.  Skin: no suspicious lesions or rashes   Neurologic: Mental status within normal limits.  Speech fluent.  No gross motor abnormalities and gait intact.  Psychiatric: mentation appears normal and affect normal.         Data:   Labs sent        Assessment:   1. Normal complete physical exam  2. Shingles, improved  3. Morbid obesity, weight loss  4. Ca breast ,alessandro  5. Colon polyp, follow up ordered  6. Fibromyalgia  7. Osteopor, to get dexa  8. hcm         Plan:   Exercise, diet, weight loss  dexa  Colonoscopy  Letter with labs  Td at pharm      Estuardo Last M.D.              Are you in the first 12 months of your Medicare coverage?  No    Healthy Habits:     In general, how would you rate your overall health?  Good    Frequency of exercise:  1 day/week    Duration of exercise:  Less than 15 minutes    Do you usually eat at least 4 servings of fruit and vegetables a day, include whole grains    & fiber and avoid regularly eating high fat or \"junk\" foods?  Yes    Taking medications regularly:  Yes    Medication side effects:  None    Ability to successfully perform activities of daily living:  No assistance needed    Home Safety:  No safety concerns identified    Hearing Impairment:  No hearing concerns    In the past 6 months, have you been bothered by leaking of urine?  No    In general, how would you rate your overall mental or emotional health?  Good      PHQ-2 Total Score: 0    Additional concerns today:  " No      Have you ever done Advance Care Planning? (For example, a Health Directive, POLST, or a discussion with a medical provider or your loved ones about your wishes): No, advance care planning information given to patient to review.  Patient plans to discuss their wishes with loved ones or provider.         Fall risk  Fallen 2 or more times in the past year?: No  Any fall with injury in the past year?: No    Cognitive Screening   1) Repeat 3 items (Leader, Season, Table)    2) Clock draw: refused  3) 3 item recall: Recalls NO objects   Results: refused    Mini-CogTM Copyright S Alejandrina. Licensed by the author for use in [facility]; reprinted with permission (sojesus@[facility]). All rights reserved.      Do you have sleep apnea, excessive snoring or daytime drowsiness?: no    Reviewed and updated as needed this visit by clinical staff     Meds   Med Hx            Reviewed and updated as needed this visit by Provider     Meds   Med Hx           Social History     Tobacco Use     Smoking status: Never     Smokeless tobacco: Never   Substance Use Topics     Alcohol use: Yes     Alcohol/week: 0.0 standard drinks     Comment: occasionally         No flowsheet data found.            Current providers sharing in care for this patient include:   Patient Care Team:  Estuardo Last MD as PCP - General  Estuardo Last MD as Assigned PCP    The following health maintenance items are reviewed in Epic and correct as of today:  Health Maintenance   Topic Date Due     ZOSTER IMMUNIZATION (2 of 3) 11/24/2016     DTAP/TDAP/TD IMMUNIZATION (2 - Td or Tdap) 02/19/2023     ANNUAL REVIEW OF HM ORDERS  06/15/2023     MEDICARE ANNUAL WELLNESS VISIT  02/09/2024     FALL RISK ASSESSMENT  02/09/2024     LIPID  08/31/2026     COLORECTAL CANCER SCREENING  11/08/2026     ADVANCE CARE PLANNING  02/09/2028     DEXA  09/18/2033     HEPATITIS C SCREENING  Completed     PHQ-2 (once per calendar year)  Completed      "INFLUENZA VACCINE  Completed     Pneumococcal Vaccine: 65+ Years  Completed     COVID-19 Vaccine  Completed     IPV IMMUNIZATION  Aged Out     MENINGITIS IMMUNIZATION  Aged Out               Review of Systems      OBJECTIVE:   /82   Pulse 71   Temp 97.7  F (36.5  C) (Temporal)   Resp 16   Ht 1.575 m (5' 2\")   Wt 107.5 kg (237 lb)   LMP 04/13/2001   SpO2 97%   BMI 43.35 kg/m   Estimated body mass index is 43.35 kg/m  as calculated from the following:    Height as of this encounter: 1.575 m (5' 2\").    Weight as of this encounter: 107.5 kg (237 lb).  Physical Exam          ASSESSMENT / PLAN:             COUNSELING:  Reviewed preventive health counseling, as reflected in patient instructions       Regular exercise       Healthy diet/nutrition      BMI:   Estimated body mass index is 43.35 kg/m  as calculated from the following:    Height as of this encounter: 1.575 m (5' 2\").    Weight as of this encounter: 107.5 kg (237 lb).   Weight management plan: Discussed healthy diet and exercise guidelines      She reports that she has never smoked. She has never used smokeless tobacco.      Appropriate preventive services were discussed with this patient, including applicable screening as appropriate for cardiovascular disease, diabetes, osteopenia/osteoporosis, and glaucoma.  As appropriate for age/gender, discussed screening for colorectal cancer, prostate cancer, breast cancer, and cervical cancer. Checklist reviewing preventive services available has been given to the patient.    Reviewed patients plan of care and provided an AVS. The Basic Care Plan (routine screening as documented in Health Maintenance) for Tika meets the Care Plan requirement. This Care Plan has been established and reviewed with the Patient.          Estuardo Last MD  [facility]    Identified Health Risks:  "

## 2023-02-16 ENCOUNTER — HOSPITAL ENCOUNTER (OUTPATIENT)
Facility: CLINIC | Age: 73
End: 2023-02-16
Attending: INTERNAL MEDICINE | Admitting: INTERNAL MEDICINE
Payer: COMMERCIAL

## 2023-02-16 ENCOUNTER — TELEPHONE (OUTPATIENT)
Dept: GASTROENTEROLOGY | Facility: CLINIC | Age: 73
End: 2023-02-16
Payer: COMMERCIAL

## 2023-02-16 NOTE — TELEPHONE ENCOUNTER
Screening Questions  BLUE  KIND OF PREP RED  LOCATION [review exclusion criteria] GREEN  SEDATION TYPE        Y Are you active on mychart?       GEORGIA GILBERT Ordering/Referring Provider?        [employer] What type of coverage do you have?      N Have you had a positive covid test in the last 14 days?     43.35 1. BMI  [BMI 40+ - review exclusion criteria]    Y  2. Are you able to give consent for your medical care? [IF NO,RN REVIEW]          N  3. Are you taking any prescription pain medications on a routine schedule   (ex narcotics: oxycodone, roxicodone, oxycontin,  and percocet)? [RN Review]          3a. EXTENDED PREP What kind of prescription?     N 4. Do you have any chemical dependencies such as alcohol, street drugs, or methadone?        **If yes 3- 5 , please schedule with MAC sedation.**          IF YES TO ANY 6 - 10 - HOSPITAL SETTING ONLY.     N 6.   Do you need assistance transferring?     N 7.   Have you had a heart or lung transplant?    N 8.   Are you currently on dialysis?   N 9.   Do you use daily home oxygen?   N 10. Do you take nitroglycerin?   10a.  If yes, how often?     11. [FEMALES]   Are you currently pregnant?    11a.  If yes, how many weeks? [ Greater than 12 weeks, OR NEEDED]    N 12. Do you have Pulmonary Hypertension? *NEED PAC APPT AT [facility] w/ [facility]*     N 13. [review exclusion criteria]  Do you have any implantable devices in your body (pacemaker, defib, LVAD)?    N 14. In the past 6 months, have you had any heart related issues including cardiomyopathy or heart attack?     14a.  If yes, did it require cardiac stenting if so when?     N 15. Have you had a stroke or Transient ischemic attack (TIA - aka  mini stroke ) within 6 months?      N 16. Do you have mod to severe Obstructive Sleep Apnea?  [Hospital only]    N 17. Do you have SEVERE AND UNCONTROLLED asthma? *NEED PAC APPT AT [facility] w/MAC*     N 18. Are you currently taking any blood thinners?     18a. If yes, inform patient to  "\"follow up w/ ordering provider for bridging instructions.\"    N 19. Do you take the medication Phentermine?    19a. If yes, \"Hold for 7 days before procedure.  Please consult your prescribing provider if you have questions about holding this medication.\"     N  20. Do you have chronic kidney disease?      N  21. Do you have a diagnosis of diabetes?     N  22. On a regular basis do you go 3-5 days between bowel movements?      23. Preferred LOCAL Pharmacy for Pre Prescription    [ LIST ONLY ONE PHARMACY]        [id number] - [place], [place] - [address]        - CLOSING REMINDERS -    Informed patient they will need an adult    Cannot take any type of public or medical transportation alone    Conscious Sedation- Needs  for 6 hours after the procedure       MAC/General-Needs  for 24 hours after procedure    Pre-Procedure Covid test to be completed [[facility] PCR Testing Required]    Confirmed Nurse will call to complete assessment       - SCHEDULING DETAILS -  NO Hospital Setting Required? If yes, what is the exclusion?:    GUANAKO  Surgeon    5/12/2023  Date of Procedure  Lower Endoscopy [Colonoscopy]  Type of Procedure Scheduled  [facility] Location   GOLYTELY EXTENDED - If you answer yes to questions #1, #3, #22 (De Melva and CF pts)Which Colonoscopy Prep was Sent? BMI     MODERATE Sedation Type     NO PAC / Pre-op Required     Patient does not think she will tolerate golytely well. Sent prep instructions but told her she can discuss other options with a nurse during pre-assessment call.         "

## 2023-03-20 ENCOUNTER — HOSPITAL ENCOUNTER (OUTPATIENT)
Dept: BONE DENSITY | Facility: CLINIC | Age: 73
Discharge: HOME | End: 2023-03-20
Attending: INTERNAL MEDICINE | Admitting: INTERNAL MEDICINE
Payer: COMMERCIAL

## 2023-03-20 DIAGNOSIS — M81.0 OSTEOPOROSIS, UNSPECIFIED OSTEOPOROSIS TYPE, UNSPECIFIED PATHOLOGICAL FRACTURE PRESENCE: ICD-10-CM

## 2023-03-20 PROCEDURE — 77080 DXA BONE DENSITY AXIAL: CPT

## 2023-03-21 NOTE — RESULT ENCOUNTER NOTE
Your bone density test is stable.  You have some bone loss, but at this time I do not think you need prescription medication.  Please be sure to get in 1000 units of vitamin D3 and also 1200mg of calcium a day in diet or supplements.    Let me know if you have questions.    Estuardo Last M.D.

## 2023-04-14 ENCOUNTER — APPOINTMENT (OUTPATIENT)
Dept: LAB | Facility: CLINIC | Age: 73
End: 2023-04-14
Payer: COMMERCIAL

## 2023-04-14 ENCOUNTER — VIRTUAL VISIT (OUTPATIENT)
Dept: FAMILY MEDICINE | Facility: CLINIC | Age: 73
End: 2023-04-14
Payer: COMMERCIAL

## 2023-04-14 DIAGNOSIS — B02.9 HERPES ZOSTER WITHOUT COMPLICATION: ICD-10-CM

## 2023-04-14 DIAGNOSIS — N39.0 URINARY TRACT INFECTION WITH HEMATURIA, SITE UNSPECIFIED: Primary | ICD-10-CM

## 2023-04-14 DIAGNOSIS — R30.0 DYSURIA: Primary | ICD-10-CM

## 2023-04-14 DIAGNOSIS — R31.9 URINARY TRACT INFECTION WITH HEMATURIA, SITE UNSPECIFIED: Primary | ICD-10-CM

## 2023-04-14 LAB
ALBUMIN UR-MCNC: 100 MG/DL
APPEARANCE UR: ABNORMAL
BACTERIA #/AREA URNS HPF: ABNORMAL /HPF
BILIRUB UR QL STRIP: NEGATIVE
COLOR UR AUTO: YELLOW
GLUCOSE UR STRIP-MCNC: NEGATIVE MG/DL
HGB UR QL STRIP: ABNORMAL
KETONES UR STRIP-MCNC: NEGATIVE MG/DL
LEUKOCYTE ESTERASE UR QL STRIP: ABNORMAL
NITRATE UR QL: POSITIVE
PH UR STRIP: 6 [PH] (ref 5–7)
RBC #/AREA URNS AUTO: ABNORMAL /HPF
SP GR UR STRIP: 1.02 (ref 1–1.03)
SQUAMOUS #/AREA URNS AUTO: ABNORMAL /LPF
UROBILINOGEN UR STRIP-ACNC: 0.2 E.U./DL
WBC #/AREA URNS AUTO: >100 /HPF

## 2023-04-14 PROCEDURE — 99213 OFFICE O/P EST LOW 20 MIN: CPT | Mod: 95 | Performed by: PHYSICIAN ASSISTANT

## 2023-04-14 PROCEDURE — 87086 URINE CULTURE/COLONY COUNT: CPT | Performed by: PHYSICIAN ASSISTANT

## 2023-04-14 PROCEDURE — 81001 URINALYSIS AUTO W/SCOPE: CPT | Performed by: PHYSICIAN ASSISTANT

## 2023-04-14 PROCEDURE — 87186 SC STD MICRODIL/AGAR DIL: CPT | Performed by: PHYSICIAN ASSISTANT

## 2023-04-14 RX ORDER — SULFAMETHOXAZOLE/TRIMETHOPRIM 800-160 MG
1 TABLET ORAL 2 TIMES DAILY
Qty: 10 TABLET | Refills: 0 | Status: SHIPPED | OUTPATIENT
Start: 2023-04-14 | End: 2024-03-18

## 2023-04-14 RX ORDER — GABAPENTIN 100 MG/1
200 CAPSULE ORAL
Qty: 180 CAPSULE | Refills: 0 | Status: SHIPPED | OUTPATIENT
Start: 2023-04-14 | End: 2023-08-13

## 2023-04-14 NOTE — PATIENT INSTRUCTIONS
Get plenty of fluids    Take tylenol as needed for pain up to 1000mg three times daily, do not exceed 3000mg in 24 hour period

## 2023-04-14 NOTE — RESULT ENCOUNTER NOTE
I called patient with results. She was started on bactrim DS bid x 5 days.  Will let her know if culture shows any resistance to bactrim when available.

## 2023-04-14 NOTE — PROGRESS NOTES
Michaela is a 73 year old who is being evaluated via a billable telephone visit.      What phone number would you like to be contacted at? [phone]  How would you like to obtain your AVS? Ewelina    Distant Location (provider location):  On-site     Assessment and Plan:     (R30.0) Dysuria  (primary encounter diagnosis)  Comment: onset yesterday, now better, has history of UTIs, able to give sample  Plan: UA with Microscopic reflex to Culture - lab         collect  Push fluids, will give urine sample today  Discussed when to be seen promptly     (B02.9) Herpes zoster without complication  Comment: had zoster in January of this year, she completed course of valtrex, denies any new lesions, has been using gabapentin for residual pain (which is improving), would like to cut down from 300mg bid ot 200mg at bedtime   Plan: gabapentin (NEURONTIN) 100 MG capsule  200mg at bedtime     Lisa Stevens PA-C            Subjective   Pat is a 73 year old, presenting for the following health issues:  Urinary Problem         View : No data to display.              HPI     Pat suspects she has a UTI  Symptoms started yesterday w/burning with urination and frequency  Symptoms are much better today, she notes a little pressure in abdomen today but no dysuria  She denies fever/chills, nausea/vomiting, new back pain    She also would like to cut down on neurontin which she takes for post herpetic neuralgia, she had shingles in January, she initially took 300mg twice daily, she is down to 300mg at bedtime, she would like to cut down more   She hasn't had any new lesions       Review of Systems   See above      Objective           Vitals:  No vitals were obtained today due to virtual visit.    Physical Exam   No exam, phone visit         Phone call duration: 10 minutes

## 2023-04-16 DIAGNOSIS — N39.0 URINARY TRACT INFECTION WITH HEMATURIA, SITE UNSPECIFIED: Primary | ICD-10-CM

## 2023-04-16 DIAGNOSIS — R31.9 URINARY TRACT INFECTION WITH HEMATURIA, SITE UNSPECIFIED: Primary | ICD-10-CM

## 2023-04-16 LAB — BACTERIA UR CULT: ABNORMAL

## 2023-04-16 RX ORDER — CEPHALEXIN 500 MG/1
500 CAPSULE ORAL 3 TIMES DAILY
Qty: 15 CAPSULE | Refills: 0 | Status: SHIPPED | OUTPATIENT
Start: 2023-04-16 | End: 2024-03-18

## 2023-07-12 ENCOUNTER — TRANSFERRED RECORDS (OUTPATIENT)
Dept: HEALTH INFORMATION MANAGEMENT | Facility: CLINIC | Age: 73
End: 2023-07-12
Payer: COMMERCIAL

## 2023-07-14 NOTE — RESULT ENCOUNTER NOTE
It was a pleasure seeing you.  I wanted to get back to you with your test results.  I have enclosed a copy for your records.    Your labs look fine.  The sugar is a bit high so please be sure to exercise and get your weight down after surgery.  This should help the cholesterol as well,.    If you have any questions please call me.          
swelling of lower extremities

## 2023-08-12 DIAGNOSIS — B02.9 HERPES ZOSTER WITHOUT COMPLICATION: ICD-10-CM

## 2023-08-13 RX ORDER — GABAPENTIN 100 MG/1
200 CAPSULE ORAL
Qty: 180 CAPSULE | Refills: 0 | Status: SHIPPED | OUTPATIENT
Start: 2023-08-13 | End: 2024-03-02

## 2023-11-07 ENCOUNTER — TELEPHONE (OUTPATIENT)
Dept: FAMILY MEDICINE | Facility: CLINIC | Age: 73
End: 2023-11-07
Payer: COMMERCIAL

## 2023-11-07 DIAGNOSIS — Z79.2 PROPHYLACTIC ANTIBIOTIC: Primary | ICD-10-CM

## 2023-11-07 RX ORDER — AMOXICILLIN 500 MG/1
CAPSULE ORAL
Qty: 12 CAPSULE | Refills: 1 | Status: SHIPPED | OUTPATIENT
Start: 2023-11-07

## 2023-11-07 NOTE — TELEPHONE ENCOUNTER
Pt is going to have a root canal and a crown done or just have it pulled. Pt stated she is going to need more than just 4 pills of amoxicillin.     Routing to PCP to review and advise.     Please call pt back with PCPs recommendations.     Preferred pharmacy- [facility]

## 2023-11-07 NOTE — TELEPHONE ENCOUNTER
"Writer called and spoke with patient to clarify.    Patient states \"I need the antibiotic because I am having dental work done\"     Patient states she has to take prophylactic amoxicillin prior to dental work due to 2 artificial knee replacements.     Patient states she is going to have to have several different dental appointments/procedures which is why she is requesting more than 4 amoxicillin if she needs to take dose prior to each dental appt/procedure.    Routing back to PCP to please advise - thank you!     Callback to patient [phone]     Patient's pharmacy:   [facility][id number] - [place], [place] - [address] [address]  [facility]  "

## 2023-11-07 NOTE — TELEPHONE ENCOUNTER
I am sorry but I do not fully understand this.  Does she need an antibiotic because she has an infection in her tooth and for some other reason?    Estuardo Last M.D.

## 2023-12-14 ENCOUNTER — PATIENT OUTREACH (OUTPATIENT)
Dept: GASTROENTEROLOGY | Facility: CLINIC | Age: 73
End: 2023-12-14
Payer: COMMERCIAL

## 2024-01-25 ENCOUNTER — NURSE TRIAGE (OUTPATIENT)
Dept: FAMILY MEDICINE | Facility: CLINIC | Age: 74
End: 2024-01-25

## 2024-01-25 ENCOUNTER — OFFICE VISIT (OUTPATIENT)
Dept: FAMILY MEDICINE | Facility: CLINIC | Age: 74
End: 2024-01-25
Payer: COMMERCIAL

## 2024-01-25 VITALS
BODY MASS INDEX: 42.58 KG/M2 | HEIGHT: 62 IN | OXYGEN SATURATION: 100 % | SYSTOLIC BLOOD PRESSURE: 130 MMHG | TEMPERATURE: 97.9 F | WEIGHT: 231.4 LBS | DIASTOLIC BLOOD PRESSURE: 74 MMHG | HEART RATE: 70 BPM | RESPIRATION RATE: 16 BRPM

## 2024-01-25 DIAGNOSIS — H61.22 IMPACTED CERUMEN OF LEFT EAR: Primary | ICD-10-CM

## 2024-01-25 PROCEDURE — 69209 REMOVE IMPACTED EAR WAX UNI: CPT | Mod: LT | Performed by: NURSE PRACTITIONER

## 2024-01-25 NOTE — PROGRESS NOTES
"  Assessment & Plan     (H61.22) Impacted cerumen of left ear  (primary encounter diagnosis)  Comment: CMA completed cerumen removal by lavage   Plan: IL REMOVAL IMPACTED CERUMEN IRRIGATION/LVG         UNILAT                Subjective   Pat is a 73 year old, presenting for the following health issues:  No chief complaint on file.    HPI     Onset: past week  Location: left ear  Sensation: plugged  Pain: denies  Cause: unknown, allergies? Ear wax?  Drainage: denies  URI: phlegm  Nasal allergies: phlegm in throat but no runny nose or sneezing  Other sx: \"crackling with rubbing outer ear\", \"feels like there's something in there      Review of Systems  Detailed as above         Objective    /74 (BP Location: Left arm, Patient Position: Sitting, Cuff Size: Adult Large)   Pulse 70   Temp 97.9  F (36.6  C)   Resp 16   Ht 1.575 m (5' 2\")   Wt 105 kg (231 lb 6.4 oz)   LMP 04/13/2001   SpO2 100%   BMI 42.32 kg/m    There is no height or weight on file to calculate BMI.  Physical Exam   NAD  Left cerumen impaction. Normal TM after removal              Signed Electronically by: JAMES Barajas CNP    "

## 2024-01-25 NOTE — TELEPHONE ENCOUNTER
"Patient reporting complete hearing loss in left ear, suspecting ear was.    Onset: past week  Location: left ear  Sensation: plugged  Pain: denies  Cause: unknown, allergies? Ear wax?  Drainage: denies  URI: phlegm  Nasal allergies: phlegm in throat but no runny nose or sneezing  Other sx: \"crackling with rubbing outer ear\", \"feels like there's something in there    Disposition   See in Office Within 3 Days. Writer advised patient to be seen within 3 days, patient was agreeable and writer made appt:  1/25/2024 1:30 PM (Arrive by 1:10 PM) Estephania Holloway APRN CNP [facility]     Ameena Lema RN  -[facility]      Reason for Disposition   Hearing loss (complete or partial) is main symptom   Decreased hearing in 1 ear and gradual onset    Additional Information   Negative: Ear pain is main symptom   Negative: Followed an ear injury   Negative: Decreased hearing with nasal allergies   Negative: Part of a cold   Negative: Follows air travel or mountain driving   Negative: Earwax, questions about   Negative: Dizziness is main symptom   Negative: Tinnitus (e.g., ringing, hissing, beating) is main symptom   Negative: Patient sounds very sick or weak to the triager   Negative: Ringing in the ears (tinnitus) and taking aspirin and dosage sounds high (i.e., > 1500 mg/day)   Negative: Hearing loss in one or both ears of sudden onset and present now   Negative: Earache   Negative: Decreased hearing followed sudden, extremely loud noise (e.g., explosion, not just loud concert)   Negative: Decreased hearing and taking medication that can damage hearing (i.e., gentamycin, tobramycin, furosemide, ethacrynic acid, cisplatin, quinidine)   Negative: Patient wants to be seen    Protocols used: Ear - Congestion-A-OH, Hearing Loss or Change-A-OH    "

## 2024-02-09 ENCOUNTER — PATIENT OUTREACH (OUTPATIENT)
Dept: GASTROENTEROLOGY | Facility: CLINIC | Age: 74
End: 2024-02-09
Payer: COMMERCIAL

## 2024-02-09 DIAGNOSIS — Z12.11 SPECIAL SCREENING FOR MALIGNANT NEOPLASMS, COLON: Primary | ICD-10-CM

## 2024-02-09 NOTE — PROGRESS NOTES
"Hx adenomatous polyps. Due in 2019 for recall   CRC Screening Colonoscopy Referral Review    Patient meets the inclusion criteria for screening colonoscopy standing order.    Ordering/Referring Provider:  Estuardo Last    BMI: Estimated body mass index is 42.32 kg/m  as calculated from the following:    Height as of 1/25/24: 1.575 m (5' 2\").    Weight as of 1/25/24: 105 kg (231 lb 6.4 oz).     Sedation:  Does patient have any of the following conditions affecting sedation?  No medical conditions affecting sedation.    Previous Scopes:  Any previous recommendations or follow up needs based on previous scope?  na / No recommendations.    Medical Concerns to Postpone Order:  Does patient have any of the following medical concerns that should postpone/delay colonoscopy referral?  No medical conditions affecting colonoscopy referral.    Final Referral Details:  Based on patient's medical history patient is appropriate for referral order with moderate sedation. If patient's BMI > 50 do not schedule in ASC.  "

## 2024-02-13 NOTE — PROGRESS NOTES
Current GI referral cancelled. Per pt report, she is scheduled at [facility] on April 19th. Advised pt to have results faxed to us once completed.     Kendal Montesinos RN, BSN  Colorectal Cancer   Division of Gastroenterology at [facility]

## 2024-03-02 DIAGNOSIS — B02.9 HERPES ZOSTER WITHOUT COMPLICATION: ICD-10-CM

## 2024-03-02 RX ORDER — GABAPENTIN 100 MG/1
200 CAPSULE ORAL
Qty: 180 CAPSULE | Refills: 0 | Status: SHIPPED | OUTPATIENT
Start: 2024-03-02 | End: 2024-03-18

## 2024-03-11 SDOH — HEALTH STABILITY: PHYSICAL HEALTH: ON AVERAGE, HOW MANY MINUTES DO YOU ENGAGE IN EXERCISE AT THIS LEVEL?: PATIENT DECLINED

## 2024-03-11 SDOH — HEALTH STABILITY: PHYSICAL HEALTH
ON AVERAGE, HOW MANY DAYS PER WEEK DO YOU ENGAGE IN MODERATE TO STRENUOUS EXERCISE (LIKE A BRISK WALK)?: PATIENT DECLINED

## 2024-03-11 ASSESSMENT — SOCIAL DETERMINANTS OF HEALTH (SDOH): HOW OFTEN DO YOU GET TOGETHER WITH FRIENDS OR RELATIVES?: MORE THAN THREE TIMES A WEEK

## 2024-03-18 ENCOUNTER — OFFICE VISIT (OUTPATIENT)
Dept: FAMILY MEDICINE | Facility: CLINIC | Age: 74
End: 2024-03-18
Payer: COMMERCIAL

## 2024-03-18 VITALS
BODY MASS INDEX: 42.33 KG/M2 | RESPIRATION RATE: 18 BRPM | HEIGHT: 62 IN | DIASTOLIC BLOOD PRESSURE: 82 MMHG | SYSTOLIC BLOOD PRESSURE: 118 MMHG | WEIGHT: 230 LBS | HEART RATE: 71 BPM | TEMPERATURE: 97.5 F | OXYGEN SATURATION: 95 %

## 2024-03-18 DIAGNOSIS — M79.7 FIBROMYALGIA: ICD-10-CM

## 2024-03-18 DIAGNOSIS — D12.6 ADENOMATOUS POLYP OF COLON, UNSPECIFIED PART OF COLON: ICD-10-CM

## 2024-03-18 DIAGNOSIS — Z12.11 SCREEN FOR COLON CANCER: ICD-10-CM

## 2024-03-18 DIAGNOSIS — C50.919 MALIGNANT NEOPLASM OF FEMALE BREAST, UNSPECIFIED ESTROGEN RECEPTOR STATUS, UNSPECIFIED LATERALITY, UNSPECIFIED SITE OF BREAST (H): ICD-10-CM

## 2024-03-18 DIAGNOSIS — Z00.00 ENCOUNTER FOR MEDICARE ANNUAL WELLNESS EXAM: Primary | ICD-10-CM

## 2024-03-18 DIAGNOSIS — E66.01 MORBID OBESITY (H): ICD-10-CM

## 2024-03-18 DIAGNOSIS — M81.0 OSTEOPOROSIS, UNSPECIFIED OSTEOPOROSIS TYPE, UNSPECIFIED PATHOLOGICAL FRACTURE PRESENCE: ICD-10-CM

## 2024-03-18 DIAGNOSIS — B02.9 HERPES ZOSTER WITHOUT COMPLICATION: ICD-10-CM

## 2024-03-18 LAB
ERYTHROCYTE [DISTWIDTH] IN BLOOD BY AUTOMATED COUNT: 13.6 % (ref 10–15)
HCT VFR BLD AUTO: 48.9 % (ref 35–47)
HGB BLD-MCNC: 15.7 G/DL (ref 11.7–15.7)
MCH RBC QN AUTO: 30 PG (ref 26.5–33)
MCHC RBC AUTO-ENTMCNC: 32.1 G/DL (ref 31.5–36.5)
MCV RBC AUTO: 94 FL (ref 78–100)
PLATELET # BLD AUTO: 207 10E3/UL (ref 150–450)
RBC # BLD AUTO: 5.23 10E6/UL (ref 3.8–5.2)
WBC # BLD AUTO: 8.3 10E3/UL (ref 4–11)

## 2024-03-18 PROCEDURE — G0439 PPPS, SUBSEQ VISIT: HCPCS | Performed by: INTERNAL MEDICINE

## 2024-03-18 PROCEDURE — 36415 COLL VENOUS BLD VENIPUNCTURE: CPT | Performed by: INTERNAL MEDICINE

## 2024-03-18 PROCEDURE — 80061 LIPID PANEL: CPT | Performed by: INTERNAL MEDICINE

## 2024-03-18 PROCEDURE — 80053 COMPREHEN METABOLIC PANEL: CPT | Performed by: INTERNAL MEDICINE

## 2024-03-18 PROCEDURE — 85027 COMPLETE CBC AUTOMATED: CPT | Performed by: INTERNAL MEDICINE

## 2024-03-18 RX ORDER — GABAPENTIN 100 MG/1
100 CAPSULE ORAL AT BEDTIME
Qty: 180 CAPSULE | Refills: 3 | Status: SHIPPED | OUTPATIENT
Start: 2024-03-18 | End: 2024-07-02

## 2024-03-18 ASSESSMENT — PAIN SCALES - GENERAL: PAINLEVEL: NO PAIN (0)

## 2024-03-18 NOTE — LETTER
March 19, 2024      Michaela Swartz  [address]   [facility] [id number]        Dear ,    It was a pleasure seeing you for your physical examination.  I wanted to get back to you with your test results.  I have enclosed a copy for your review.     I am happy to report that your cbc or complete blood count is normal with no signs of anemia, leukemia or platelet abnormalities. Your chemistry panel shows no signs of diabetes.  Your blood salts, kidney tests, liver tests, and proteins are all fine.     Your total cholesterol is 201 with the normal range being below 200.  Your HDL or good cholesterol is 52 with the normal range being above 50.  Your LDL or bad cholesterol is 123 with the normal range being below 130.  These numbers are just a bit higher than I would like to see them so please work hard on exercise and try to get your weight down to improve them.     Overall the labs look fine but please work on the weight and exercise.  If you have questions let me know.     Estuardo Last M.D.     Resulted Orders   CBC with platelets   Result Value Ref Range    WBC Count 8.3 4.0 - 11.0 10e3/uL    RBC Count 5.23 (H) 3.80 - 5.20 10e6/uL    Hemoglobin 15.7 11.7 - 15.7 g/dL    Hematocrit 48.9 (H) 35.0 - 47.0 %    MCV 94 78 - 100 fL    MCH 30.0 26.5 - 33.0 pg    MCHC 32.1 31.5 - 36.5 g/dL    RDW 13.6 10.0 - 15.0 %    Platelet Count 207 150 - 450 10e3/uL   Comprehensive metabolic panel   Result Value Ref Range    Sodium 140 135 - 145 mmol/L      Comment:      Reference intervals for this test were updated on 09/26/2023 to more accurately reflect our healthy population. There may be differences in the flagging of prior results with similar values performed with this method. Interpretation of those prior results can be made in the context of the updated reference intervals.     Potassium 4.7 3.4 - 5.3 mmol/L    Carbon Dioxide (CO2) 25 22 - 29 mmol/L    Anion Gap 10 7 - 15 mmol/L    Urea Nitrogen 19.2 8.0  - 23.0 mg/dL    Creatinine 0.97 (H) 0.51 - 0.95 mg/dL    GFR Estimate 61 >60 mL/min/1.73m2    Calcium 9.6 8.8 - 10.2 mg/dL    Chloride 105 98 - 107 mmol/L    Glucose 115 (H) 70 - 99 mg/dL    Alkaline Phosphatase 103 40 - 150 U/L      Comment:      Reference intervals for this test were updated on 11/14/2023 to more accurately reflect our healthy population. There may be differences in the flagging of prior results with similar values performed with this method. Interpretation of those prior results can be made in the context of the updated reference intervals.    AST 23 0 - 45 U/L      Comment:      Reference intervals for this test were updated on 6/12/2023 to more accurately reflect our healthy population. There may be differences in the flagging of prior results with similar values performed with this method. Interpretation of those prior results can be made in the context of the updated reference intervals.    ALT 12 0 - 50 U/L      Comment:      Reference intervals for this test were updated on 6/12/2023 to more accurately reflect our healthy population. There may be differences in the flagging of prior results with similar values performed with this method. Interpretation of those prior results can be made in the context of the updated reference intervals.      Protein Total 7.4 6.4 - 8.3 g/dL    Albumin 4.4 3.5 - 5.2 g/dL    Bilirubin Total 0.7 <=1.2 mg/dL   Lipid panel reflex to direct LDL Fasting   Result Value Ref Range    Cholesterol 201 (H) <200 mg/dL    Triglycerides 131 <150 mg/dL    Direct Measure HDL 52 >=50 mg/dL    LDL Cholesterol Calculated 123 (H) <=100 mg/dL    Non HDL Cholesterol 149 (H) <130 mg/dL    Patient Fasting > 8hrs? Yes     Narrative    Cholesterol  Desirable:  <200 mg/dL    Triglycerides  Normal:  Less than 150 mg/dL  Borderline High:  150-199 mg/dL  High:  200-499 mg/dL  Very High:  Greater than or equal to 500 mg/dL    Direct Measure HDL  Female:  Greater than or equal to 50 mg/dL    Male:  Greater than or equal to 40 mg/dL    LDL Cholesterol  Desirable:  <100mg/dL  Above Desirable:  100-129 mg/dL   Borderline High:  130-159 mg/dL   High:  160-189 mg/dL   Very High:  >= 190 mg/dL    Non HDL Cholesterol  Desirable:  130 mg/dL  Above Desirable:  130-159 mg/dL  Borderline High:  160-189 mg/dL  High:  190-219 mg/dL  Very High:  Greater than or equal to 220 mg/dL

## 2024-03-18 NOTE — PROGRESS NOTES
Preventive Care Visit  [facility] BRIE Last MD, Internal Medicine  Mar 18, 2024          Subjective   Pat is a 74 year old, presenting for the following:    Overall she is doing well.  She is the primary caregiver for her .  She has occasional low back pain.  No other complaints.    She does have osteopenia is noted with follow-up DEXA done last year.  I discussed this with her and she would rather not take treatment at this time but does take calcium and vitamin D.    She has fibromyalgia which is stable.  She is overdue for her colonoscopy and has a date in April now.  She has a history of breast cancer is noted.  Her weight remains an issue.  She otherwise is doing well.               Past Medical History:      Past Medical History:   Diagnosis Date    Adenomatous colon polyp 2006    fu rec 2011, fu done 2016 and polyps, to fu 2019    Arthritis     Breast cancer (H) 2006    bilat mastectomy, no chemo or xrt, Dr. Garcia, not seeing onc as of 2015    Fibromyalgia     Gastroesophageal reflux disease     Lymphedema     Motion sickness     Obese     Osteoporosis 2016    -1.9 left hip, -0.8 spine, dx based on low impact fx, tried fosamax but gi upset, fu dexa 2018 stable, fu 2023 stable    PONV (postoperative nausea and vomiting)     Shingles 01/2023             Past Surgical History:      Past Surgical History:   Procedure Laterality Date    ARTHROPLASTY KNEE Right 09/10/2021    Procedure: TOTAL KNEE ARTHROPLASTY right;  Surgeon: Delta Treviño MD;  Location: [facility] Main OR    BIOPSY OF BREAST, NEEDLE CORE  11/22/2005    right breast    BIOPSY OF BREAST, NEEDLE CORE  12/05/2005    left and right breast    CATARACT EXTRACTION  2022    COLONOSCOPY  2017    EYE SURGERY  June 2022    Cataract surgery both eyes    fractured wrist Left 2015    low impact    HYSTERECTOMY, PAP STILL INDICATED  2005    left total knee  Left 07/24/2020    MASTECTOMY      MASTECTOMY SIMPLE BILATERAL  2005     ORTHOPEDIC SURGERY      WRIST SURGERY Left     ZZC VAGINAL HYSTERECTOMY  2005    prolapsed uterus             Social History:     Social History     Socioeconomic History    Marital status:      Spouse name: Not on file    Number of children: 2    Years of education: Not on file    Highest education level: Not on file   Occupational History    Occupation: reitred   Tobacco Use    Smoking status: Never    Smokeless tobacco: Never   Vaping Use    Vaping Use: Never used   Substance and Sexual Activity    Alcohol use: Yes     Comment: Occasionally    Drug use: Never    Sexual activity: Not Currently     Partners: Male     Birth control/protection: None   Other Topics Concern    Parent/sibling w/ CABG, MI or angioplasty before 65F 55M? No   Social History Narrative    Not on file     Social Determinants of Health     Financial Resource Strain: Low Risk  (3/11/2024)    Financial Resource Strain     Within the past 12 months, have you or your family members you live with been unable to get utilities (heat, electricity) when it was really needed?: No   Food Insecurity: Low Risk  (3/11/2024)    Food Insecurity     Within the past 12 months, did you worry that your food would run out before you got money to buy more?: No     Within the past 12 months, did the food you bought just not last and you didn t have money to get more?: No   Transportation Needs: Low Risk  (3/11/2024)    Transportation Needs     Within the past 12 months, has lack of transportation kept you from medical appointments, getting your medicines, non-medical meetings or appointments, work, or from getting things that you need?: No   Physical Activity: Patient Declined (3/11/2024)    Exercise Vital Sign     Days of Exercise per Week: Patient declined     Minutes of Exercise per Session: Patient declined   Stress: No Stress Concern Present (3/11/2024)    [employer] - Occupational Stress Questionnaire     Feeling of  "Stress : Only a little   Social Connections: Unknown (3/11/2024)    Social Connection and Isolation Panel [NHANES]     Frequency of Communication with Friends and Family: Not on file     Frequency of Social Gatherings with Friends and Family: More than three times a week     Attends Rastafarian Services: Not on file     Active Member of Clubs or Organizations: Not on file     Attends Club or Organization Meetings: Not on file     Marital Status: Not on file   Interpersonal Safety: Not on file   Housing Stability: Low Risk  (3/11/2024)    Housing Stability     Do you have housing? : Yes     Are you worried about losing your housing?: No             Family History:   reviewed         Allergies:     Allergies   Allergen Reactions    Macrobid [Nitrofurantoin] Hives    Seasonal Allergies      Eye watering, nasal symptoms             Medications:     Current Outpatient Medications   Medication Sig Dispense Refill    amoxicillin (AMOXIL) 500 MG capsule Take 4 pills 1 hour prior to dental procedure 12 capsule 1    gabapentin (NEURONTIN) 100 MG capsule Take 1 capsule (100 mg) by mouth at bedtime 180 capsule 3    Magnesium Oxide -Mg Supplement (MAGNESIUM EXTRA STRENGTH) 400 MG CAPS       VITAMIN D, CHOLECALCIFEROL, PO Take 2,000 Units by mouth daily      fexofenadine (ALLEGRA) 180 MG tablet Take 180 mg by mouth daily                 Review of Systems:     The 10 point Review of Systems is negative other than noted in the HPI           Physical Exam:   Blood pressure 118/82, pulse 71, temperature 97.5  F (36.4  C), resp. rate 18, height 1.575 m (5' 2\"), weight 104.3 kg (230 lb), last menstrual period 04/13/2001, SpO2 95%, not currently breastfeeding.    Exam:  Constitutional: healthy appearing, alert and in no distress  Heent: Normocephalic. Head without obvious masses or lesions. PERRLDC, EOMI. Mouth exam within normal limits: tongue, mucous membranes, posterior pharynx all normal, no lesions or abnormalities seen.  Tm's and " canals within normal limits bilaterally. Neck supple, no nuchal rigidity or masses. No supraclavicular, or cervical adenopathy. Thyroid symmetric, no masses.  Cardiovascular: Regular rate and rhythm, no murmer, rub or gallops.  JVP not elevated, no edema.  Carotids within normal limits bilaterally, no bruits.  Respiratory: Normal respiratory effort.  Lungs clear, normal flow, no wheezing or crackles.  Gastrointestinal: Normal active bowel sounds.   Soft, not tender, no masses, guarding or rebound.  No hepatosplenomegaly.   Musculoskeletal: extremities normal, no gross deformities noted.  Skin: no suspicious lesions or rashes   Neurologic: Mental status within normal limits.  Speech fluent.  No gross motor abnormalities and gait intact.  Psychiatric: mentation appears normal and affect normal.         Data:   Labs sent        Assessment:   Normal complete physical exam  Osteopenia, follow up dexa one year  Breast ca, alessandro  Fibromyalgia, stable  Colon polyp, has upcoming follow-up for this  Morbid obesity, weight loss recommended  Healthcare maintenance         Plan:   Tetanus shot at pharmacy  Exercise, diet and weight loss  Colonoscopy  DEXA next year  Up-to-date other immunizations  Letter with labs      Estuardo Last M.D.            Physical        Health Care Directive  Patient does not have a Health Care Directive or Living Will: Discussed advance care planning with patient; information given to patient to review.    HPI              3/11/2024   General Health   How would you rate your overall physical health? Good   Feel stress (tense, anxious, or unable to sleep) Only a little   (!) STRESS CONCERN      3/11/2024   Nutrition   Diet: Regular (no restrictions)         3/11/2024   Exercise   Days per week of moderate/strenous exercise Patient declined   Average minutes spent exercising at this level Patient declined         3/11/2024   Social Factors   Frequency of gathering with friends or relatives More than  three times a week   Worry food won't last until get money to buy more No   Food not last or not have enough money for food? No   Do you have housing?  Yes   Are you worried about losing your housing? No   Lack of transportation? No   Unable to get utilities (heat,electricity)? No         3/11/2024   Activities of Daily Living- Home Safety   Needs help with the following daily activites None of the above   Safety concerns in the home None of the above         3/11/2024   Dental   Dentist two times every year? Yes         3/11/2024   Hearing Screening   Hearing concerns? (!) I NEED TO ASK PEOPLE TO SPEAK UP OR REPEAT THEMSELVES.    (!) IT'S HARD TO FOLLOW A CONVERSATION IN A NOISY RESTAURANT OR CROWDED ROOM.    (!) TROUBLE UNDERSTANDING SOFT OR WHISPERED SPEECH.         3/11/2024   Driving Risk Screening   Patient/family members have concerns about driving No         3/11/2024   General Alertness/Fatigue Screening   Have you been more tired than usual lately? No         3/11/2024   Urinary Incontinence Screening   Bothered by leaking urine in past 6 months No         3/11/2024   TB Screening   Were you born outside of [place]?  No         Today's PHQ-2 Score:       3/18/2024    10:34 AM   PHQ-2 ( 1999 [employer])   Q1: Little interest or pleasure in doing things 0   Q2: Feeling down, depressed or hopeless 0   PHQ-2 Score 0   Q1: Little interest or pleasure in doing things Not at all   Q2: Feeling down, depressed or hopeless Not at all   PHQ-2 Score 0           3/11/2024   Substance Use   Alcohol more than 3/day or more than 7/wk No   Do you have a current opioid prescription? No   How severe/bad is pain from 1 to 10? 5/10   Do you use any other substances recreationally? No     Social History     Tobacco Use    Smoking status: Never    Smokeless tobacco: Never   Vaping Use    Vaping Use: Never used   Substance Use Topics    Alcohol use: Yes     Comment: Occasionally    Drug use: Never              ASCVD Risk   The  "10-year ASCVD risk score (Abigail QUESADA, et al., 2019) is: 12.4%    Values used to calculate the score:      Age: 74 years      Sex: Female      Is Non- : No      Diabetic: No      Tobacco smoker: No      Systolic Blood Pressure: 118 mmHg      Is BP treated: No      HDL Cholesterol: 51 mg/dL      Total Cholesterol: 189 mg/dL            Reviewed and updated as needed this visit by Provider                      Current providers sharing in care for this patient include:  Patient Care Team:  Estuardo Last MD as PCP - General  Estuardo Last MD as Assigned PCP    The following health maintenance items are reviewed in Epic and correct as of today:  Health Maintenance   Topic Date Due    RSV VACCINE (Pregnancy & 60+) (1 - 1-dose 60+ series) Never done    COLORECTAL CANCER SCREENING  11/08/2019    DTAP/TDAP/TD IMMUNIZATION (2 - Td or Tdap) 02/19/2023    ANNUAL REVIEW OF HM ORDERS  06/15/2023    MEDICARE ANNUAL WELLNESS VISIT  03/18/2025    FALL RISK ASSESSMENT  03/18/2025    GLUCOSE  02/09/2026    LIPID  02/09/2028    ADVANCE CARE PLANNING  02/09/2028    DEXA  03/20/2038    HEPATITIS C SCREENING  Completed    PHQ-2 (once per calendar year)  Completed    INFLUENZA VACCINE  Completed    Pneumococcal Vaccine: 65+ Years  Completed    ZOSTER IMMUNIZATION  Completed    COVID-19 Vaccine  Completed    IPV IMMUNIZATION  Aged Out    HPV IMMUNIZATION  Aged Out    MENINGITIS IMMUNIZATION  Aged Out    RSV MONOCLONAL ANTIBODY  Aged Out            Objective    Exam  /82 (BP Location: Left arm, Patient Position: Sitting, Cuff Size: Adult Large)   Pulse 71   Temp 97.5  F (36.4  C)   Resp 18   Ht 1.575 m (5' 2\")   Wt 104.3 kg (230 lb)   LMP 04/13/2001   SpO2 95%   BMI 42.07 kg/m     Estimated body mass index is 42.07 kg/m  as calculated from the following:    Height as of this encounter: 1.575 m (5' 2\").    Weight as of this encounter: 104.3 kg (230 lb).    Physical Exam           No data to " display                       Signed Electronically by: Estuardo Last MD

## 2024-03-18 NOTE — PATIENT INSTRUCTIONS
Get the tetanus shot at your pharmacy.      Preventive Care Advice   This is general advice given by our system to help you stay healthy. However, your care team may have specific advice just for you. Please talk to your care team about your preventive care needs.  Nutrition  Eat 5 or more servings of fruits and vegetables each day.  Try wheat bread, brown rice and whole grain pasta (instead of white bread, rice, and pasta).  Get enough calcium and vitamin D. Check the label on foods and aim for 100% of the RDA (recommended daily allowance).  Lifestyle  Exercise at least 150 minutes each week   (30 minutes a day, 5 days a week).  Do muscle strengthening activities 2 days a week. These help control your weight and prevent disease.  No smoking.  Wear sunscreen to prevent skin cancer.  Have a dental exam and cleaning every 6 months.  Yearly exams  See your health care team every year to talk about:  Any changes in your health.  Any medicines your care team has prescribed.  Preventive care, family planning, and ways to prevent chronic diseases.  Shots (vaccines)   HPV shots (up to age 26), if you've never had them before.  Hepatitis B shots (up to age 59), if you've never had them before.  COVID-19 shot: Get this shot when it's due.  Flu shot: Get a flu shot every year.  Tetanus shot: Get a tetanus shot every 10 years.  Pneumococcal, hepatitis A, and RSV shots: Ask your care team if you need these based on your risk.  Shingles shot (for age 50 and up).  General health tests  Diabetes screening:  Starting at age 35, Get screened for diabetes at least every 3 years.  If you are younger than age 35, ask your care team if you should be screened for diabetes.  Cholesterol test: At age 39, start having a cholesterol test every 5 years, or more often if advised.  Bone density scan (DEXA): At age 50, ask your care team if you should have this scan for osteoporosis (brittle bones).  Hepatitis C: Get tested at least once in your  life.  STIs (sexually transmitted infections)  Before age 24: Ask your care team if you should be screened for STIs.  After age 24: Get screened for STIs if you're at risk. You are at risk for STIs (including HIV) if:  You are sexually active with more than one person.  You don't use condoms every time.  You or a partner was diagnosed with a sexually transmitted infection.  If you are at risk for HIV, ask about PrEP medicine to prevent HIV.  Get tested for HIV at least once in your life, whether you are at risk for HIV or not.  Cancer screening tests  Cervical cancer screening: If you have a cervix, begin getting regular cervical cancer screening tests at age 21. Most people who have regular screenings with normal results can stop after age 65. Talk about this with your provider.  Breast cancer scan (mammogram): If you've ever had breasts, begin having regular mammograms starting at age 40. This is a scan to check for breast cancer.  Colon cancer screening: It is important to start screening for colon cancer at age 45.  Have a colonoscopy test every 10 years (or more often if you're at risk) Or, ask your provider about stool tests like a FIT test every year or Cologuard test every 3 years.  To learn more about your testing options, visit: https://[email or web address].pdf.  For help making a decision, visit: https://bit.ly/pe13443.  Prostate cancer screening test: If you have a prostate and are age 55 to 69, ask your provider if you would benefit from a yearly prostate cancer screening test.  Lung cancer screening: If you are a current or former smoker age 50 to 80, ask your care team if ongoing lung cancer screenings are right for you.  For informational purposes only. Not to replace the advice of your health care provider. Copyright   2023 [employer]. All rights reserved. Clinically reviewed by the [facility]. [employer] 991080 - REV 01/24.    Hearing Loss: Care  Instructions  Overview     Hearing loss is a sudden or slow decrease in how well you hear. It can range from slight to profound. Permanent hearing loss can occur with aging. It also can happen when you are exposed long-term to loud noise. Examples include listening to loud music, riding motorcycles, or being around other loud machines.  Hearing loss can affect your work and home life. It can make you feel lonely or depressed. You may feel that you have lost your independence. But hearing aids and other devices can help you hear better and feel connected to others.  Follow-up care is a key part of your treatment and safety. Be sure to make and go to all appointments, and call your doctor if you are having problems. It's also a good idea to know your test results and keep a list of the medicines you take.  How can you care for yourself at home?  Avoid loud noises whenever possible. This helps keep your hearing from getting worse.  Always wear hearing protection around loud noises.  Wear a hearing aid as directed.  A professional can help you pick a hearing aid that will work best for you.  You can also get hearing aids over the counter for mild to moderate hearing loss.  Have hearing tests as your doctor suggests. They can show whether your hearing has changed. Your hearing aid may need to be adjusted.  Use other devices as needed. These may include:  Telephone amplifiers and hearing aids that can connect to a television, stereo, radio, or microphone.  Devices that use lights or vibrations. These alert you to the doorbell, a ringing telephone, or a baby monitor.  Television closed-captioning. This shows the words at the bottom of the screen. Most new TVs can do this.  TTY (text telephone). This lets you type messages back and forth on the telephone instead of talking or listening. These devices are also called TDD. When messages are typed on the keyboard, they are sent over the phone line to a receiving TTY. The  "message is shown on a monitor.  Use text messaging, social media, and email if it is hard for you to communicate by telephone.  Try to learn a listening technique called speechreading. It is not lipreading. You pay attention to people's gestures, expressions, posture, and tone of voice. These clues can help you understand what a person is saying. Face the person you are talking to, and have them face you. Make sure the lighting is good. You need to see the other person's face clearly.  Think about counseling if you need help to adjust to your hearing loss.  When should you call for help?  Watch closely for changes in your health, and be sure to contact your doctor if:    You think your hearing is getting worse.     You have new symptoms, such as dizziness or nausea.   Where can you learn more?  Go to https://www.[email or web address].net/patiented  Enter R798 in the search box to learn more about \"Hearing Loss: Care Instructions.\"  Current as of: September 27, 2023               Content Version: 14.0    9674-2547 [employer].   Care instructions adapted under license by your healthcare professional. If you have questions about a medical condition or this instruction, always ask your healthcare professional. [employer] disclaims any warranty or liability for your use of this information.      Learning About Stress  What is stress?     Stress is your body's response to a hard situation. Your body can have a physical, emotional, or mental response. Stress is a fact of life for most people, and it affects everyone differently. What causes stress for you may not be stressful for someone else.  A lot of things can cause stress. You may feel stress when you go on a job interview, take a test, or run a race. This kind of short-term stress is normal and even useful. It can help you if you need to work hard or react quickly. For example, stress can help you finish an important job on time.  Long-term stress is " caused by ongoing stressful situations or events. Examples of long-term stress include long-term health problems, ongoing problems at work, or conflicts in your family. Long-term stress can harm your health.  How does stress affect your health?  When you are stressed, your body responds as though you are in danger. It makes hormones that speed up your heart, make you breathe faster, and give you a burst of energy. This is called the fight-or-flight stress response. If the stress is over quickly, your body goes back to normal and no harm is done.  But if stress happens too often or lasts too long, it can have bad effects. Long-term stress can make you more likely to get sick, and it can make symptoms of some diseases worse. If you tense up when you are stressed, you may develop neck, shoulder, or low back pain. Stress is linked to high blood pressure and heart disease.  Stress also harms your emotional health. It can make you olivier, tense, or depressed. Your relationships may suffer, and you may not do well at work or school.  What can you do to manage stress?  You can try these things to help manage stress:   Do something active. Exercise or activity can help reduce stress. Walking is a great way to get started. Even everyday activities such as housecleaning or yard work can help.  Try yoga or yaniv chi. These techniques combine exercise and meditation. You may need some training at first to learn them.  Do something you enjoy. For example, listen to music or go to a movie. Practice your hobby or do volunteer work.  Meditate. This can help you relax, because you are not worrying about what happened before or what may happen in the future.  Do guided imagery. Imagine yourself in any setting that helps you feel calm. You can use online videos, books, or a teacher to guide you.  Do breathing exercises. For example:  From a standing position, bend forward from the waist with your knees slightly bent. Let your arms dangle  "close to the floor.  Breathe in slowly and deeply as you return to a standing position. Roll up slowly and lift your head last.  Hold your breath for just a few seconds in the standing position.  Breathe out slowly and bend forward from the waist.  Let your feelings out. Talk, laugh, cry, and express anger when you need to. Talking with supportive friends or family, a counselor, or a kristal leader about your feelings is a healthy way to relieve stress. Avoid discussing your feelings with people who make you feel worse.  Write. It may help to write about things that are bothering you. This helps you find out how much stress you feel and what is causing it. When you know this, you can find better ways to cope.  What can you do to prevent stress?  You might try some of these things to help prevent stress:  Manage your time. This helps you find time to do the things you want and need to do.  Get enough sleep. Your body recovers from the stresses of the day while you are sleeping.  Get support. Your family, friends, and community can make a difference in how you experience stress.  Limit your news feed. Avoid or limit time on social media or news that may make you feel stressed.  Do something active. Exercise or activity can help reduce stress. Walking is a great way to get started.  Where can you learn more?  Go to https://www.[employer].net/patiented  Enter N032 in the search box to learn more about \"Learning About Stress.\"  Current as of: October 24, 2023               Content Version: 14.0    8015-8866 [employer].   Care instructions adapted under license by your healthcare professional. If you have questions about a medical condition or this instruction, always ask your healthcare professional. [employer] disclaims any warranty or liability for your use of this information.      "

## 2024-03-19 LAB
ALBUMIN SERPL BCG-MCNC: 4.4 G/DL (ref 3.5–5.2)
ALP SERPL-CCNC: 103 U/L (ref 40–150)
ALT SERPL W P-5'-P-CCNC: 12 U/L (ref 0–50)
ANION GAP SERPL CALCULATED.3IONS-SCNC: 10 MMOL/L (ref 7–15)
AST SERPL W P-5'-P-CCNC: 23 U/L (ref 0–45)
BILIRUB SERPL-MCNC: 0.7 MG/DL
BUN SERPL-MCNC: 19.2 MG/DL (ref 8–23)
CALCIUM SERPL-MCNC: 9.6 MG/DL (ref 8.8–10.2)
CHLORIDE SERPL-SCNC: 105 MMOL/L (ref 98–107)
CHOLEST SERPL-MCNC: 201 MG/DL
CREAT SERPL-MCNC: 0.97 MG/DL (ref 0.51–0.95)
DEPRECATED HCO3 PLAS-SCNC: 25 MMOL/L (ref 22–29)
EGFRCR SERPLBLD CKD-EPI 2021: 61 ML/MIN/1.73M2
FASTING STATUS PATIENT QL REPORTED: YES
GLUCOSE SERPL-MCNC: 115 MG/DL (ref 70–99)
HDLC SERPL-MCNC: 52 MG/DL
LDLC SERPL CALC-MCNC: 123 MG/DL
NONHDLC SERPL-MCNC: 149 MG/DL
POTASSIUM SERPL-SCNC: 4.7 MMOL/L (ref 3.4–5.3)
PROT SERPL-MCNC: 7.4 G/DL (ref 6.4–8.3)
SODIUM SERPL-SCNC: 140 MMOL/L (ref 135–145)
TRIGL SERPL-MCNC: 131 MG/DL

## 2024-03-19 NOTE — RESULT ENCOUNTER NOTE
It was a pleasure seeing you for your physical examination.  I wanted to get back to you with your test results.  I have enclosed a copy for your review.     I am happy to report that your cbc or complete blood count is normal with no signs of anemia, leukemia or platelet abnormalities. Your chemistry panel shows no signs of diabetes.  Your blood salts, kidney tests, liver tests, and proteins are all fine.    Your total cholesterol is 201 with the normal range being below 200.  Your HDL or good cholesterol is 52 with the normal range being above 50.  Your LDL or bad cholesterol is 123 with the normal range being below 130.  These numbers are just a bit higher than I would like to see them so please work hard on exercise and try to get your weight down to improve them.    Overall the labs look fine but please work on the weight and exercise.  If you have questions let me know.    Estuardo Last M.D.

## 2024-04-19 ENCOUNTER — TRANSFERRED RECORDS (OUTPATIENT)
Dept: HEALTH INFORMATION MANAGEMENT | Facility: CLINIC | Age: 74
End: 2024-04-19
Payer: COMMERCIAL

## 2024-04-24 ENCOUNTER — PATIENT OUTREACH (OUTPATIENT)
Dept: GASTROENTEROLOGY | Facility: CLINIC | Age: 74
End: 2024-04-24
Payer: COMMERCIAL

## 2024-07-02 ENCOUNTER — NURSE TRIAGE (OUTPATIENT)
Dept: FAMILY MEDICINE | Facility: CLINIC | Age: 74
End: 2024-07-02

## 2024-07-02 ENCOUNTER — VIRTUAL VISIT (OUTPATIENT)
Dept: INTERNAL MEDICINE | Facility: CLINIC | Age: 74
End: 2024-07-02
Payer: COMMERCIAL

## 2024-07-02 DIAGNOSIS — Z20.822 SUSPECTED COVID-19 VIRUS INFECTION: ICD-10-CM

## 2024-07-02 DIAGNOSIS — R05.1 ACUTE COUGH: Primary | ICD-10-CM

## 2024-07-02 PROCEDURE — 99213 OFFICE O/P EST LOW 20 MIN: CPT | Mod: 95 | Performed by: PHYSICIAN ASSISTANT

## 2024-07-02 NOTE — PROGRESS NOTES
"Michaela is a 74 year old who is being evaluated via a billable video visit.    How would you like to obtain your AVS? MyChart  If the video visit is dropped, the invitation should be resent by: Text to cell phone: [phone]  Will anyone else be joining your video visit? No      Assessment & Plan     Acute cough    - Symptomatic COVID-19 Virus (Coronavirus) by PCR Nose; Future    Suspected COVID-19 virus infection      - Symptomatic COVID-19 Virus (Coronavirus) by PCR Nose; Future          BMI  Estimated body mass index is 42.07 kg/m  as calculated from the following:    Height as of 3/18/24: 1.575 m (5' 2\").    Weight as of 3/18/24: 104.3 kg (230 lb).   Weight management plan: Patient was referred to their PCP to discuss a diet and exercise plan.      Patient may elect to do a home test - she will need to have someone get her a new kit from the pharmacy  If she does the clinic ordered test she will need to schedule a lab only appointment        Subjective   Michaela is a 74 year old, presenting for the following health issues:  Suspected Covid      Video Start Time:  2:23 pm    HPI     Acute Illness  Acute illness concerns: Suspected covid  Onset/Duration: X1 day (friend has covid that she has been around) Did do a covid test but, they were  in 2023. Did test positive twice with two test but, .   Symptoms:  Fever: No  Chills/Sweats: No  Headache (location?): YES above eyes and in back of head  Sinus Pressure: YES  Conjunctivitis:  No  Ear Pain: no  Rhinorrhea: No  Congestion: No  Sore Throat: YES  Cough: YES-barking  Wheeze: No  Decreased Appetite: No  Nausea: No  Vomiting: No  Diarrhea: No  Dysuria/Freq.: No  Dysuria or Hematuria: No  Fatigue/Achiness: YES  Sick/Strep Exposure: YES- friend  Therapies tried and outcome: ibuprofen and tylenol every 4-6 hours            Objective           Vitals:  No vitals were obtained today due to virtual visit.    Physical Exam   GENERAL: alert and no distress    RESP: " No audible wheeze, cough,     Mentation and speech appropriate for age.  PSYCH: Appropriate affect, tone, and pace of words          Video-Visit Details    Type of service:  Video Visit   Video End Time:2:33 PM  Originating Location (pt. Location): Home    Distant Location (provider location):  On-site  Platform used for Video Visit: Rodolfo  Signed Electronically by: Shannen Zimmerman PA-C

## 2024-07-02 NOTE — TELEPHONE ENCOUNTER
"Pt reproting covid exposure and is having mild symptoms. Pt is assuming she is covid positive, but is not able to determine this without a covid test. Pt does not have a non  covid test at home. The pt is reporting that she did have a positive test this morning, but it was an  home kit( [employer], LOT#[id number], exp 23)  Covid home kit has been verified through the FDA website that the kit is , results are not valid.     Triage completed. Pt does not require a higher level of care for symptoms.     Pt is requesting an appointment to discuss lab order and treatment. Routing to provider for review. Please update plan of care and route to care team as needed.     1. COVID-19 DIAGNOSIS: \"How do you know that you have COVID?\" (e.g., positive lab test or self-test, diagnosed by doctor or NP/PA, symptoms after exposure).      2024, home kit ()   2. COVID-19 EXPOSURE: \"Was there any known exposure to COVID before the symptoms began?\" CDC Definition of close contact: within 6 feet (2 meters) for a total of 15 minutes or more over a 24-hour period.       Yes - friend brought food over and then tested positive   3. ONSET: \"When did the COVID-19 symptoms start?\"       24  4. WORST SYMPTOM: \"What is your worst symptom?\" (e.g., cough, fever, shortness of breath, muscle aches)      Muscle aches, headache, burning eyes   5. COUGH: \"Do you have a cough?\" If Yes, ask: \"How bad is the cough?\"        Dry cough. mild  6. FEVER: \"Do you have a fever?\" If Yes, ask: \"What is your temperature, how was it measured, and when did it start?\"      Denies   7. RESPIRATORY STATUS: \"Describe your breathing?\" (e.g., normal; shortness of breath, wheezing, unable to speak)       Normal   8. BETTER-SAME-WORSE: \"Are you getting better, staying the same or getting worse compared to yesterday?\"  If getting worse, ask, \"In what way?\"      Same   9. OTHER SYMPTOMS: \"Do you have any other symptoms?\"  (e.g., chills, fatigue, " "headache, loss of smell or taste, muscle pain, sore throat)      Headache, fatigue  10. HIGH RISK DISEASE: \"Do you have any chronic medical problems?\" (e.g., asthma, heart or lung disease, weak immune system, obesity, etc.)        Yes   11. VACCINE: \"Have you had the COVID-19 vaccine?\" If Yes, ask: \"Which one, how many shots, when did you get it?\"        Yes   12. PREGNANCY: \"Is there any chance you are pregnant?\" \"When was your last menstrual period?\"        No   13. O2 SATURATION MONITOR:  \"Do you use an oxygen saturation monitor (pulse oximeter) at home?\" If Yes, ask \"What is your reading (oxygen level) today?\" \"What is your usual oxygen saturation reading?\" (e.g., 95%)        No    Reason for Disposition   COVID-19 infection suspected by caller or triager and mild symptoms (cough, fever, or others) and has not gotten tested yet    Additional Information   Negative: SEVERE difficulty breathing (e.g., struggling for each breath, speaks in single words)   Negative: Difficult to awaken or acting confused (e.g., disoriented, slurred speech)   Negative: Bluish (or gray) lips or face now   Negative: Shock suspected (e.g., cold/pale/clammy skin, too weak to stand, low BP, rapid pulse)   Negative: Sounds like a life-threatening emergency to the triager   Negative: Diagnosed or suspected COVID-19 and symptoms lasting 3 or more weeks   Negative: COVID-19 exposure and no symptoms   Negative: COVID-19 vaccine reaction suspected (e.g., fever, headache, muscle aches) occurring 1 to 3 days after getting vaccine   Negative: COVID-19 vaccine, questions about   Negative: Lives with someone known to have influenza (flu test positive) and flu-like symptoms (e.g., cough, runny nose, sore throat, SOB; with or without fever)   Negative: Possible COVID-19 symptoms and triager concerned about severity of symptoms or other causes   Negative: COVID-19 and breastfeeding, questions about   Negative: SEVERE or constant chest pain or pressure  " (Exception: Mild central chest pain, present only when coughing.)   Negative: MODERATE difficulty breathing (e.g., speaks in phrases, SOB even at rest, pulse 100-120)   Negative: Headache and stiff neck (can't touch chin to chest)   Negative: Oxygen level (e.g., pulse oximetry) 90% or lower   Negative: Chest pain or pressure  (Exception: MILD central chest pain, present only when coughing.)   Negative: Drinking very little and dehydration suspected (e.g., no urine > 12 hours, very dry mouth, very lightheaded)   Negative: Patient sounds very sick or weak to the triager   Negative: MILD difficulty breathing (e.g., minimal/no SOB at rest, SOB with walking, pulse <100)   Negative: Fever > 103 F (39.4 C)   Negative: Fever > 101 F (38.3 C) and over 60 years of age   Negative: Fever > 100.0 F (37.8 C) and bedridden (e.g., CVA, chronic illness, recovering from surgery)   Negative: HIGH RISK patient (e.g., weak immune system, age > 64 years, obesity with BMI of 30 or higher, pregnant, chronic lung disease or other chronic medical condition) and COVID symptoms (e.g., cough, fever)  (Exceptions: Already seen by doctor or NP/PA and no new or worsening symptoms.)   Negative: HIGH RISK patient and influenza is widespread in the community and ONE OR MORE respiratory symptoms: cough, sore throat, runny or stuffy nose   Negative: HIGH RISK patient and influenza exposure within the last 7 days and ONE OR MORE respiratory symptoms: cough, sore throat, runny or stuffy nose   Negative: Oxygen level (e.g., pulse oximetry) 91 to 94%   Negative: COVID-19 diagnosed by doctor (or NP/PA) and mild symptoms (e.g., cough, fever, others) and no complications or SOB   Negative: COVID-19 diagnosed and has mild nausea, vomiting or diarrhea   Negative: COVID-19 diagnosed by positive lab test (e.g., PCR, rapid self-test kit) and mild symptoms (e.g., cough, fever, others) and no complications or SOB   Negative: COVID-19 infection suspected by caller or  triager and mild symptoms (cough, fever, or others) and negative COVID-19 rapid test   Negative: Fever present > 3 days (72 hours)   Negative: Fever returns after gone for over 24 hours and symptoms worse or not improved   Negative: Continuous (nonstop) coughing interferes with work or school and no improvement using cough treatment per Care Advice   Negative: Cough present > 3 weeks   Negative: COVID-19 diagnosed by positive lab test (e.g., PCR, rapid self-test kit) and NO symptoms (e.g., cough, fever, others)    Protocols used: Coronavirus (COVID-19) Diagnosed or [id number]

## 2024-07-03 ENCOUNTER — LAB (OUTPATIENT)
Dept: FAMILY MEDICINE | Facility: CLINIC | Age: 74
End: 2024-07-03
Attending: PHYSICIAN ASSISTANT
Payer: COMMERCIAL

## 2024-07-03 DIAGNOSIS — R05.1 ACUTE COUGH: ICD-10-CM

## 2024-07-03 DIAGNOSIS — Z20.822 SUSPECTED COVID-19 VIRUS INFECTION: ICD-10-CM

## 2024-07-03 LAB — SARS-COV-2 RNA RESP QL NAA+PROBE: POSITIVE

## 2024-07-03 PROCEDURE — 99207 PR NO CHARGE NURSE ONLY: CPT

## 2024-07-03 PROCEDURE — 87635 SARS-COV-2 COVID-19 AMP PRB: CPT

## 2024-07-03 NOTE — PROGRESS NOTES
Tika Swartz is a 74 year old patient who comes in today for a Covid PCR swab. Swab was completed and sent to the lab.     Mary Mcgovern [facility]

## 2024-07-04 ENCOUNTER — TELEPHONE (OUTPATIENT)
Dept: NURSING | Facility: CLINIC | Age: 74
End: 2024-07-04
Payer: COMMERCIAL

## 2024-07-04 NOTE — TELEPHONE ENCOUNTER
Patient classified as COVID treatment eligible by Epic high risk algorithm:  Yes    Coronavirus (COVID-19) Notification    Reason for call  Notify of POSITIVE COVID-19 lab result, assess symptoms,  review [facility] recommendations    Lab Result   Lab test for 2019-nCoV rRt-PCR or SARS-COV-2 PCR  Oropharyngeal AND/OR nasopharyngeal swabs were POSITIVE for 2019-nCoV RNA [OR] SARS-COV-2 RNA (COVID-19) RNA     We have been unable to reach patient by phone at this time to notify of their Positive COVID-19 result.    Left voicemail message requesting a call back to [phone] [facility] for results.        A Positive COVID-19 letter will be sent via [employer] or the mail.    Corrine Nieves

## 2025-05-26 SDOH — HEALTH STABILITY: PHYSICAL HEALTH: ON AVERAGE, HOW MANY MINUTES DO YOU ENGAGE IN EXERCISE AT THIS LEVEL?: PATIENT DECLINED

## 2025-05-26 ASSESSMENT — SOCIAL DETERMINANTS OF HEALTH (SDOH): HOW OFTEN DO YOU GET TOGETHER WITH FRIENDS OR RELATIVES?: MORE THAN THREE TIMES A WEEK

## 2025-05-29 ENCOUNTER — RESULTS FOLLOW-UP (OUTPATIENT)
Dept: FAMILY MEDICINE | Facility: CLINIC | Age: 75
End: 2025-05-29

## 2025-05-29 ENCOUNTER — OFFICE VISIT (OUTPATIENT)
Dept: FAMILY MEDICINE | Facility: CLINIC | Age: 75
End: 2025-05-29
Payer: COMMERCIAL

## 2025-05-29 VITALS
TEMPERATURE: 98.2 F | SYSTOLIC BLOOD PRESSURE: 138 MMHG | HEART RATE: 62 BPM | BODY MASS INDEX: 40.3 KG/M2 | RESPIRATION RATE: 16 BRPM | DIASTOLIC BLOOD PRESSURE: 74 MMHG | OXYGEN SATURATION: 98 % | WEIGHT: 219 LBS | HEIGHT: 62 IN

## 2025-05-29 DIAGNOSIS — M81.0 OSTEOPOROSIS, UNSPECIFIED OSTEOPOROSIS TYPE, UNSPECIFIED PATHOLOGICAL FRACTURE PRESENCE: ICD-10-CM

## 2025-05-29 DIAGNOSIS — Z00.00 ENCOUNTER FOR MEDICARE ANNUAL WELLNESS EXAM: Primary | ICD-10-CM

## 2025-05-29 DIAGNOSIS — D12.6 ADENOMATOUS POLYP OF COLON, UNSPECIFIED PART OF COLON: ICD-10-CM

## 2025-05-29 DIAGNOSIS — E66.01 MORBID OBESITY (H): ICD-10-CM

## 2025-05-29 DIAGNOSIS — M79.7 FIBROMYALGIA: ICD-10-CM

## 2025-05-29 LAB
ALBUMIN SERPL BCG-MCNC: 4.1 G/DL (ref 3.5–5.2)
ALP SERPL-CCNC: 96 U/L (ref 40–150)
ALT SERPL W P-5'-P-CCNC: 11 U/L (ref 0–50)
ANION GAP SERPL CALCULATED.3IONS-SCNC: 12 MMOL/L (ref 7–15)
AST SERPL W P-5'-P-CCNC: 22 U/L (ref 0–45)
BILIRUB SERPL-MCNC: 0.6 MG/DL
BUN SERPL-MCNC: 20.2 MG/DL (ref 8–23)
CALCIUM SERPL-MCNC: 9.5 MG/DL (ref 8.8–10.4)
CHLORIDE SERPL-SCNC: 106 MMOL/L (ref 98–107)
CHOLEST SERPL-MCNC: 176 MG/DL
CREAT SERPL-MCNC: 0.91 MG/DL (ref 0.51–0.95)
EGFRCR SERPLBLD CKD-EPI 2021: 65 ML/MIN/1.73M2
ERYTHROCYTE [DISTWIDTH] IN BLOOD BY AUTOMATED COUNT: 13.3 % (ref 10–15)
FASTING STATUS PATIENT QL REPORTED: YES
FASTING STATUS PATIENT QL REPORTED: YES
GLUCOSE SERPL-MCNC: 101 MG/DL (ref 70–99)
HCO3 SERPL-SCNC: 25 MMOL/L (ref 22–29)
HCT VFR BLD AUTO: 45.7 % (ref 35–47)
HDLC SERPL-MCNC: 55 MG/DL
HGB BLD-MCNC: 15.1 G/DL (ref 11.7–15.7)
LDLC SERPL CALC-MCNC: 104 MG/DL
MCH RBC QN AUTO: 31.1 PG (ref 26.5–33)
MCHC RBC AUTO-ENTMCNC: 33 G/DL (ref 31.5–36.5)
MCV RBC AUTO: 94 FL (ref 78–100)
NONHDLC SERPL-MCNC: 121 MG/DL
PLATELET # BLD AUTO: 179 10E3/UL (ref 150–450)
POTASSIUM SERPL-SCNC: 3.9 MMOL/L (ref 3.4–5.3)
PROT SERPL-MCNC: 6.9 G/DL (ref 6.4–8.3)
RBC # BLD AUTO: 4.86 10E6/UL (ref 3.8–5.2)
SODIUM SERPL-SCNC: 143 MMOL/L (ref 135–145)
TRIGL SERPL-MCNC: 84 MG/DL
WBC # BLD AUTO: 6.3 10E3/UL (ref 4–11)

## 2025-05-29 RX ORDER — GABAPENTIN 100 MG/1
100 CAPSULE ORAL AT BEDTIME
Qty: 90 CAPSULE | Refills: 3 | Status: SHIPPED | OUTPATIENT
Start: 2025-05-29

## 2025-05-29 RX ORDER — GABAPENTIN 100 MG/1
100 CAPSULE ORAL AT BEDTIME
COMMUNITY
Start: 2025-03-10 | End: 2025-05-29

## 2025-05-29 ASSESSMENT — PAIN SCALES - GENERAL: PAINLEVEL_OUTOF10: NO PAIN (0)

## 2025-05-29 NOTE — PATIENT INSTRUCTIONS
I would get the prevnar 20 pneumonia shot at your pharmacy.Patient Education   Preventive Care Advice   This is general advice given by our system to help you stay healthy. However, your care team may have specific advice just for you. Please talk to your care team about your preventive care needs.  Nutrition  Eat 5 or more servings of fruits and vegetables each day.  Try wheat bread, brown rice and whole grain pasta (instead of white bread, rice, and pasta).  Get enough calcium and vitamin D. Check the label on foods and aim for 100% of the RDA (recommended daily allowance).  Lifestyle  Exercise at least 150 minutes each week  (30 minutes a day, 5 days a week).  Do muscle strengthening activities 2 days a week. These help control your weight and prevent disease.  No smoking.  Wear sunscreen to prevent skin cancer.  Have a dental exam and cleaning every 6 months.  Yearly exams  See your health care team every year to talk about:  Any changes in your health.  Any medicines your care team has prescribed.  Preventive care, family planning, and ways to prevent chronic diseases.  Shots (vaccines)   HPV shots (up to age 26), if you've never had them before.  Hepatitis B shots (up to age 59), if you've never had them before.  COVID-19 shot: Get this shot when it's due.  Flu shot: Get a flu shot every year.  Tetanus shot: Get a tetanus shot every 10 years.  Pneumococcal, hepatitis A, and RSV shots: Ask your care team if you need these based on your risk.  Shingles shot (for age 50 and up)  General health tests  Diabetes screening:  Starting at age 35, Get screened for diabetes at least every 3 years.  If you are younger than age 35, ask your care team if you should be screened for diabetes.  Cholesterol test: At age 39, start having a cholesterol test every 5 years, or more often if advised.  Bone density scan (DEXA): At age 50, ask your care team if you should have this scan for osteoporosis (brittle bones).  Hepatitis C:  Get tested at least once in your life.  STIs (sexually transmitted infections)  Before age 24: Ask your care team if you should be screened for STIs.  After age 24: Get screened for STIs if you're at risk. You are at risk for STIs (including HIV) if:  You are sexually active with more than one person.  You don't use condoms every time.  You or a partner was diagnosed with a sexually transmitted infection.  If you are at risk for HIV, ask about PrEP medicine to prevent HIV.  Get tested for HIV at least once in your life, whether you are at risk for HIV or not.  Cancer screening tests  Cervical cancer screening: If you have a cervix, begin getting regular cervical cancer screening tests starting at age 21.  Breast cancer scan (mammogram): If you've ever had breasts, begin having regular mammograms starting at age 40. This is a scan to check for breast cancer.  Colon cancer screening: It is important to start screening for colon cancer at age 45.  Have a colonoscopy test every 10 years (or more often if you're at risk) Or, ask your provider about stool tests like a FIT test every year or Cologuard test every 3 years.  To learn more about your testing options, visit:   .  For help making a decision, visit:   https://bit.ly/zz53357.  Prostate cancer screening test: If you have a prostate, ask your care team if a prostate cancer screening test (PSA) at age 55 is right for you.  Lung cancer screening: If you are a current or former smoker ages 50 to 80, ask your care team if ongoing lung cancer screenings are right for you.  For informational purposes only. Not to replace the advice of your health care provider. Copyright   2023 [employer]. All rights reserved. Clinically reviewed by the [facility] Transitions Program. [facility] 188733 - REV 01/24.  Hearing Loss: Care Instructions  Overview     Hearing loss is a sudden or slow decrease in how well you hear. It can range from slight to profound.  Permanent hearing loss can occur with aging. It also can happen when you are exposed long-term to loud noise. Examples include listening to loud music, riding motorcycles, or being around other loud machines.  Hearing loss can affect your work and home life. It can make you feel lonely or depressed. You may feel that you have lost your independence. But hearing aids and other devices can help you hear better and feel connected to others.  Follow-up care is a key part of your treatment and safety. Be sure to make and go to all appointments, and call your doctor if you are having problems. It's also a good idea to know your test results and keep a list of the medicines you take.  How can you care for yourself at home?  Avoid loud noises whenever possible. This helps keep your hearing from getting worse.  Always wear hearing protection around loud noises.  Wear a hearing aid as directed.  A professional can help you pick a hearing aid that will work best for you.  You can also get hearing aids over the counter for mild to moderate hearing loss.  Have hearing tests as your doctor suggests. They can show whether your hearing has changed. Your hearing aid may need to be adjusted.  Use other devices as needed. These may include:  Telephone amplifiers and hearing aids that can connect to a television, stereo, radio, or microphone.  Devices that use lights or vibrations. These alert you to the doorbell, a ringing telephone, or a baby monitor.  Television closed-captioning. This shows the words at the bottom of the screen. Most new TVs can do this.  TTY (text telephone). This lets you type messages back and forth on the telephone instead of talking or listening. These devices are also called TDD. When messages are typed on the keyboard, they are sent over the phone line to a receiving TTY. The message is shown on a monitor.  Use text messaging, social media, and email if it is hard for you to communicate by telephone.  Try to  "learn a listening technique called speechreading. It is not lipreading. You pay attention to people's gestures, expressions, posture, and tone of voice. These clues can help you understand what a person is saying. Face the person you are talking to, and have them face you. Make sure the lighting is good. You need to see the other person's face clearly.  Think about counseling if you need help to adjust to your hearing loss.  When should you call for help?  Watch closely for changes in your health, and be sure to contact your doctor if:    You think your hearing is getting worse.     You have new symptoms, such as dizziness or nausea.   Where can you learn more?  Go to https://www.[employer].net/patiented  Enter R798 in the search box to learn more about \"Hearing Loss: Care Instructions.\"  Current as of: October 27, 2024  Content Version: 14.4    2501-7604 [employer].   Care instructions adapted under license by your healthcare professional. If you have questions about a medical condition or this instruction, always ask your healthcare professional. [employer] disclaims any warranty or liability for your use of this information.       "

## 2025-05-29 NOTE — PROGRESS NOTES
Preventive Care Visit  [facility] BRIE Last MD, Internal Medicine  May 29, 2025          Subjective   Pat is a 75 year old, presenting for the following:    She is doing very well and her weight is down some.  She cares for her .  She gets exercise mostly from that.  She has no complaints.               Past Medical History:      Past Medical History:   Diagnosis Date    Adenomatous colon polyp 2006    fu rec 2011, fu done 2016 and polyps, to fu 2019; fu nl 2024, no fu needed    Arthritis     Breast cancer (H) 2006    bilat mastectomy, no chemo or xrt, Dr. Garcia, not seeing onc as of 2015    Fibromyalgia     Gastroesophageal reflux disease     Lymphedema     Motion sickness     Obese     Osteoporosis 2016    -1.9 left hip, -0.8 spine, dx based on low impact fx, tried fosamax but gi upset, fu dexa 2018 stable, fu 2023 stable    PONV (postoperative nausea and vomiting)     Shingles 01/2023             Past Surgical History:      Past Surgical History:   Procedure Laterality Date    ARTHROPLASTY KNEE Right 09/10/2021    Procedure: TOTAL KNEE ARTHROPLASTY right;  Surgeon: Delta Treviño MD;  Location: [facility] Main OR    BIOPSY OF BREAST, NEEDLE CORE  11/22/2005    right breast    BIOPSY OF BREAST, NEEDLE CORE  12/05/2005    left and right breast    CATARACT EXTRACTION  2022    COLONOSCOPY  2017    EYE SURGERY  June 2022    Cataract surgery both eyes    fractured wrist Left 2015    low impact    HYSTERECTOMY, PAP STILL INDICATED  2005    left total knee  Left 07/24/2020    MASTECTOMY      MASTECTOMY SIMPLE BILATERAL  2005    ORTHOPEDIC SURGERY      WRIST SURGERY Left     ZZC VAGINAL HYSTERECTOMY  2005    prolapsed uterus             Social History:     Social History     Socioeconomic History    Marital status:      Spouse name: Not on file    Number of children: 2    Years of education: Not on file    Highest education level: Not on file   Occupational History     Occupation: reitred   Tobacco Use    Smoking status: Never    Smokeless tobacco: Never   Vaping Use    Vaping status: Never Used   Substance and Sexual Activity    Alcohol use: Yes     Comment: Occasionally    Drug use: Never    Sexual activity: Not Currently     Partners: Male     Birth control/protection: None   Other Topics Concern    Parent/sibling w/ CABG, MI or angioplasty before 65F 55M? No   Social History Narrative    Not on file     Social Drivers of Health     Financial Resource Strain: Low Risk  (5/26/2025)    Financial Resource Strain     Within the past 12 months, have you or your family members you live with been unable to get utilities (heat, electricity) when it was really needed?: No   Food Insecurity: Low Risk  (5/26/2025)    Food Insecurity     Within the past 12 months, did you worry that your food would run out before you got money to buy more?: No     Within the past 12 months, did the food you bought just not last and you didn t have money to get more?: No   Transportation Needs: Low Risk  (5/26/2025)    Transportation Needs     Within the past 12 months, has lack of transportation kept you from medical appointments, getting your medicines, non-medical meetings or appointments, work, or from getting things that you need?: No   Physical Activity: Patient Declined (5/26/2025)    Exercise Vital Sign     Days of Exercise per Week: Patient declined     Minutes of Exercise per Session: Patient declined   Stress: No Stress Concern Present (5/26/2025)    [employer] [place] [employer] - Occupational Stress Questionnaire     Feeling of Stress : Not at all   Social Connections: Unknown (5/26/2025)    Social Connection and Isolation Panel [NHANES]     Frequency of Communication with Friends and Family: Not on file     Frequency of Social Gatherings with Friends and Family: More than three times a week     Attends Christianity Services: Not on file     Active Member of Clubs or Organizations: Not  "on file     Attends Club or Organization Meetings: Not on file     Marital Status: Not on file   Interpersonal Safety: Low Risk  (5/29/2025)    Interpersonal Safety     Do you feel physically and emotionally safe where you currently live?: Yes     Within the past 12 months, have you been hit, slapped, kicked or otherwise physically hurt by someone?: No     Within the past 12 months, have you been humiliated or emotionally abused in other ways by your partner or ex-partner?: No   Housing Stability: Low Risk  (5/26/2025)    Housing Stability     Do you have housing? : Yes     Are you worried about losing your housing?: No             Family History:   reviewed         Allergies:     Allergies   Allergen Reactions    Macrobid [Nitrofurantoin] Hives    Seasonal Allergies      Eye watering, nasal symptoms             Medications:     Current Outpatient Medications   Medication Sig Dispense Refill    gabapentin (NEURONTIN) 100 MG capsule Take 1 capsule (100 mg) by mouth at bedtime. 90 capsule 3    amoxicillin (AMOXIL) 500 MG capsule Take 4 pills 1 hour prior to dental procedure 12 capsule 1    Magnesium Oxide -Mg Supplement (MAGNESIUM EXTRA STRENGTH) 400 MG CAPS       VITAMIN D, CHOLECALCIFEROL, PO Take 2,000 Units by mouth daily                 Review of Systems:     The 10 point Review of Systems is negative other than noted in the HPI           Physical Exam:   Blood pressure 138/74, pulse 62, temperature 98.2  F (36.8  C), temperature source Temporal, resp. rate 16, height 1.575 m (5' 2\"), weight 99.3 kg (219 lb), last menstrual period 04/13/2001, SpO2 98%, not currently breastfeeding.    Exam:  Constitutional: healthy appearing, alert and in no distress  Heent: Normocephalic. Head without obvious masses or lesions. PERRLDC, EOMI. Mouth exam within normal limits: tongue, mucous membranes, posterior pharynx all normal, no lesions or abnormalities seen.  Tm's and canals within normal limits bilaterally. Neck supple, no " nuchal rigidity or masses. No supraclavicular, or cervical adenopathy. Thyroid symmetric, no masses.  Cardiovascular: Regular rate and rhythm, no murmer, rub or gallops.  JVP not elevated, no edema.  Carotids within normal limits bilaterally, no bruits.  Respiratory: Normal respiratory effort.  Lungs clear, normal flow, no wheezing or crackles.  Gastrointestinal: Normal active bowel sounds.   Soft, not tender, no masses, guarding or rebound.  No hepatosplenomegaly.   Musculoskeletal: extremities normal, no gross deformities noted.  Skin: no suspicious lesions or rashes   Neurologic: Mental status within normal limits.  Speech fluent.  No gross motor abnormalities and gait intact.  Psychiatric: mentation appears normal and affect normal.         Data:   Labs sent        Assessment:   Normal complete physical exam  Fibromyalgia, doing well, using gabapentin with good success  Colon polyp, no follow-up needed  Osteoporosis, DEXA ordered  Morbid obesity, weight loss  Healthcare maintenance         Plan:   Prevnar 20 at pharmacy  Bone density  Letter with labs  Exercise, diet, weight loss recommended      Estuardo Last M.D.      Appropriate preventive services were discussed with this patient via screening, questionairrere, or discussion as appropriate for fall prevention, nutrition, physical activity, social engagement, weight loss and cognition.  Check list reviewing preventive services available has been given to the patient.  Reviewed patient's diet, addressing concerns and questions as appropriate.  Advice given for smoking sensation when appropriate.      No chief complaint on file.           HPI           Advance Care Planning    Discussed advance care planning with patient; informed AVS has link to [facility].        5/26/2025   General Health   How would you rate your overall physical health? Good   Feel stress (tense, anxious, or unable to sleep) Not at all         5/26/2025   Nutrition   Diet: Regular  (no restrictions)         5/26/2025   Exercise   Days per week of moderate/strenous exercise Patient declined   Average minutes spent exercising at this level Patient declined         5/26/2025   Social Factors   Frequency of gathering with friends or relatives More than three times a week   Worry food won't last until get money to buy more No   Food not last or not have enough money for food? No   Do you have housing? (Housing is defined as stable permanent housing and does not include staying outside in a car, in a tent, in an abandoned building, in an overnight shelter, or couch-surfing.) Yes   Are you worried about losing your housing? No   Lack of transportation? No   Unable to get utilities (heat,electricity)? No         5/26/2025   Fall Risk   Fallen 2 or more times in the past year? No   Trouble with walking or balance? No          5/26/2025   Activities of Daily Living- Home Safety   Needs help with the following daily activites None of the above   Safety concerns in the home None of the above         5/26/2025   Dental   Dentist two times every year? Yes         5/26/2025   Hearing Screening   Hearing concerns? (!) IT'S HARD TO FOLLOW A CONVERSATION IN A NOISY RESTAURANT OR CROWDED ROOM.    (!) TROUBLE UNDERSTANDING SOFT OR WHISPERED SPEECH.       Multiple values from one day are sorted in reverse-chronological order         5/26/2025   Driving Risk Screening   Patient/family members have concerns about driving No         5/26/2025   General Alertness/Fatigue Screening   Have you been more tired than usual lately? No         5/26/2025   Urinary Incontinence Screening   Bothered by leaking urine in past 6 months No         Today's PHQ-2 Score:       5/29/2025     6:49 AM   PHQ-2 ( 1999 [employer])   Q1: Little interest or pleasure in doing things 0   Q2: Feeling down, depressed or hopeless 0   PHQ-2 Score 0    Q1: Little interest or pleasure in doing things Not at all   Q2: Feeling down, depressed or hopeless  "Not at all   PHQ-2 Score 0       Patient-reported           5/26/2025   Substance Use   Alcohol more than 3/day or more than 7/wk No   Do you have a current opioid prescription? No   How severe/bad is pain from 1 to 10? 2/10   Do you use any other substances recreationally? No     Social History     Tobacco Use    Smoking status: Never    Smokeless tobacco: Never   Vaping Use    Vaping status: Never Used   Substance Use Topics    Alcohol use: Yes     Comment: Occasionally    Drug use: Never              ASCVD Risk   The ASCVD Risk score (Abigail QUESADA, et al., 2019) failed to calculate for the following reasons:    The systolic blood pressure is missing            Reviewed and updated as needed this visit by Provider   Tobacco  Allergies  Meds  Problems  Med Hx  Surg Hx  Fam Hx              Current providers sharing in care for this patient include:  Patient Care Team:  Estuardo Last MD as PCP - General  Estuardo Last MD as Assigned PCP    The following health maintenance items are reviewed in Epic and correct as of today:  Health Maintenance   Topic Date Due    ANNUAL REVIEW OF HM ORDERS  06/15/2023    RSV VACCINE (1 - 1-dose 75+ series) Never done    COVID-19 VACCINE (7 - 2024-25 season) 04/04/2025    MEDICARE ANNUAL WELLNESS VISIT  05/29/2026    FALL RISK ASSESSMENT  05/29/2026    DIABETES SCREENING  03/18/2027    LIPID  03/18/2029    ADVANCE CARE PLANNING  03/18/2029    DTAP/TDAP/TD VACCINE (3 - Td or Tdap) 04/15/2034    DEXA  03/20/2038    HEPATITIS C SCREENING  Completed    PHQ-2 (once per calendar year)  Completed    INFLUENZA VACCINE  Completed    PNEUMOCOCCAL VACCINE 50+ YEARS  Completed    ZOSTER VACCINE  Completed    HPV VACCINE  Aged Out    MENINGITIS VACCINE  Aged Out    COLORECTAL CANCER SCREENING  Discontinued            Objective    Exam  LMP 04/13/2001    Estimated body mass index is 42.07 kg/m  as calculated from the following:    Height as of 3/18/24: 1.575 m (5' 2\").   "  Weight as of 3/18/24: 104.3 kg (230 lb).    Physical Exam          3/18/2024   Mini Cog   Mini-Cog Not Completed (choose reason) Patient declines              Signed Electronically by: Estuardo Last MD

## 2025-05-30 NOTE — RESULT ENCOUNTER NOTE
It was very nice to see you.  You should be able to view your test results.    Your CBC your blood count is normal with no signs of anemia or blood disorders.  Your chemistry panel shows no diabetes.  Your blood salts, kidney test, liver tests, and proteins are fine.    Your total cholesterol is 176.  Your HDL or good cholesterol is fine.  The LDL or bad is just slightly high.  Please be sure to exercise when you can and get your weight down to improve the numbers.    Overall the tests look fine.  If you have questions let me know.    Estuardo Last M.D.

## (undated) DEVICE — GLOVE BIOGEL PI ULTRATOUCH G SZ 8.5 42185

## (undated) DEVICE — CUSTOM PACK TOTAL KNEE SOP5BTKHEC

## (undated) DEVICE — HOOD FLYTE SURGICOOL

## (undated) DEVICE — DRAPE SHEET REV FOLD 3/4 9349

## (undated) DEVICE — SUCTION MANIFOLD NEPTUNE 2 SYS 4 PORT 0702-020-000

## (undated) DEVICE — CUFF TOURN 34IN STRL DISP

## (undated) DEVICE — SU MONOCRYL 3-0 PS-2 18" UND MCP497G

## (undated) DEVICE — SUTURE VICRYL+ 1 27IN CT-1 UND VCP261H

## (undated) DEVICE — GLOVE BIOGEL PI ULTRATOUCH G SZ 8.0 42180

## (undated) DEVICE — SOL NACL 0.9% IRRIG 1000ML BOTTLE 2F7124

## (undated) DEVICE — GLOVE UNDER INDICATOR PI SZ 8.5 LF 41685

## (undated) DEVICE — SOL WATER IRRIG 1000ML BOTTLE 2F7114

## (undated) DEVICE — CUSTOM PACK TOTAL KNEE ACCESSORY SOP5BTAHEA

## (undated) DEVICE — ADH SKIN CLOSURE PREMIERPRO EXOFIN 1.0ML 3470

## (undated) DEVICE — BLADE SAW SAGITTAL STRK DUAL CUT 4118-135-090

## (undated) DEVICE — GLOVE BIOGEL PI INDICATOR 8.0 LF 41680

## (undated) DEVICE — SOL NACL 0.9% INJ 1000ML BAG 2B1324X

## (undated) DEVICE — DRESSING MEPILEX AG SILVER 4X12 395990

## (undated) DEVICE — HOLDER LIMB VELCRO OR 0814-1533

## (undated) DEVICE — SUTURE VICRYL+ 2-0 27IN CT-1 UND VCP259H

## (undated) DEVICE — DECANTER VIAL 2006S

## (undated) DEVICE — SUTURE VICRYL+ 2 27IN CP UNDYED VCP195H

## (undated) DEVICE — PLATE GROUNDING ADULT W/CORD 9165L

## (undated) DEVICE — SU STRATAFIX PDS PLUS 1 CT-1 18" SXPP1A404